# Patient Record
Sex: FEMALE | Race: BLACK OR AFRICAN AMERICAN | Employment: OTHER | URBAN - METROPOLITAN AREA
[De-identification: names, ages, dates, MRNs, and addresses within clinical notes are randomized per-mention and may not be internally consistent; named-entity substitution may affect disease eponyms.]

---

## 2021-08-19 ENCOUNTER — HOSPITAL ENCOUNTER (INPATIENT)
Age: 76
LOS: 8 days | Discharge: HOSPICE | DRG: 871 | End: 2021-08-27
Attending: EMERGENCY MEDICINE | Admitting: INTERNAL MEDICINE
Payer: MEDICARE

## 2021-08-19 ENCOUNTER — APPOINTMENT (OUTPATIENT)
Dept: GENERAL RADIOLOGY | Age: 76
DRG: 871 | End: 2021-08-19
Payer: MEDICARE

## 2021-08-19 DIAGNOSIS — Z51.5 HOSPICE CARE: ICD-10-CM

## 2021-08-19 DIAGNOSIS — J96.01 ACUTE RESPIRATORY FAILURE WITH HYPOXIA (HCC): Primary | ICD-10-CM

## 2021-08-19 DIAGNOSIS — A41.9 SEPTICEMIA (HCC): ICD-10-CM

## 2021-08-19 PROBLEM — E44.0 MODERATE MALNUTRITION (HCC): Chronic | Status: ACTIVE | Noted: 2021-08-19

## 2021-08-19 PROBLEM — J69.0 ACUTE ASPIRATION PNEUMONIA (HCC): Status: ACTIVE | Noted: 2021-08-19

## 2021-08-19 LAB
ANION GAP SERPL CALCULATED.3IONS-SCNC: 17 MMOL/L (ref 3–16)
ANION GAP SERPL CALCULATED.3IONS-SCNC: 18 MMOL/L (ref 3–16)
BASE EXCESS ARTERIAL: -1.6 MMOL/L (ref -3–3)
BASE EXCESS VENOUS: -2.2 MMOL/L
BASOPHILS ABSOLUTE: 0.1 K/UL (ref 0–0.2)
BASOPHILS ABSOLUTE: 0.1 K/UL (ref 0–0.2)
BASOPHILS RELATIVE PERCENT: 0.5 %
BASOPHILS RELATIVE PERCENT: 0.6 %
BUN BLDV-MCNC: 19 MG/DL (ref 7–20)
BUN BLDV-MCNC: 19 MG/DL (ref 7–20)
CALCIUM SERPL-MCNC: 10 MG/DL (ref 8.3–10.6)
CALCIUM SERPL-MCNC: 8.9 MG/DL (ref 8.3–10.6)
CARBOXYHEMOGLOBIN ARTERIAL: <1 % (ref 0–1.5)
CARBOXYHEMOGLOBIN: 1.4 %
CHLORIDE BLD-SCNC: 93 MMOL/L (ref 99–110)
CHLORIDE BLD-SCNC: 93 MMOL/L (ref 99–110)
CO2: 17 MMOL/L (ref 21–32)
CO2: 20 MMOL/L (ref 21–32)
CREAT SERPL-MCNC: 0.7 MG/DL (ref 0.6–1.2)
CREAT SERPL-MCNC: 0.8 MG/DL (ref 0.6–1.2)
D DIMER: 315 NG/ML DDU (ref 0–229)
EOSINOPHILS ABSOLUTE: 0 K/UL (ref 0–0.6)
EOSINOPHILS ABSOLUTE: 0 K/UL (ref 0–0.6)
EOSINOPHILS RELATIVE PERCENT: 0 %
EOSINOPHILS RELATIVE PERCENT: 0.2 %
ESTIMATED AVERAGE GLUCOSE: 125.5 MG/DL
GFR AFRICAN AMERICAN: >60
GFR AFRICAN AMERICAN: >60
GFR NON-AFRICAN AMERICAN: >60
GFR NON-AFRICAN AMERICAN: >60
GLUCOSE BLD-MCNC: 107 MG/DL (ref 70–99)
GLUCOSE BLD-MCNC: 109 MG/DL (ref 70–99)
GLUCOSE BLD-MCNC: 124 MG/DL (ref 70–99)
GLUCOSE BLD-MCNC: 128 MG/DL (ref 70–99)
GLUCOSE BLD-MCNC: 158 MG/DL (ref 70–99)
GLUCOSE BLD-MCNC: 176 MG/DL (ref 70–99)
GLUCOSE BLD-MCNC: 78 MG/DL (ref 70–99)
HBA1C MFR BLD: 6 %
HCO3 ARTERIAL: 21.5 MMOL/L (ref 21–29)
HCO3 VENOUS: 22 MMOL/L (ref 23–29)
HCT VFR BLD CALC: 30.6 % (ref 36–48)
HCT VFR BLD CALC: 35.3 % (ref 36–48)
HEMOGLOBIN, ART, EXTENDED: 10.2 G/DL (ref 12–16)
HEMOGLOBIN: 10.2 G/DL (ref 12–16)
HEMOGLOBIN: 11.7 G/DL (ref 12–16)
LACTIC ACID: 1.1 MMOL/L (ref 0.4–2)
LYMPHOCYTES ABSOLUTE: 0.7 K/UL (ref 1–5.1)
LYMPHOCYTES ABSOLUTE: 0.7 K/UL (ref 1–5.1)
LYMPHOCYTES RELATIVE PERCENT: 4.5 %
LYMPHOCYTES RELATIVE PERCENT: 5.4 %
MAGNESIUM: 1.4 MG/DL (ref 1.8–2.4)
MCH RBC QN AUTO: 28.9 PG (ref 26–34)
MCH RBC QN AUTO: 29 PG (ref 26–34)
MCHC RBC AUTO-ENTMCNC: 33.1 G/DL (ref 31–36)
MCHC RBC AUTO-ENTMCNC: 33.2 G/DL (ref 31–36)
MCV RBC AUTO: 87.3 FL (ref 80–100)
MCV RBC AUTO: 87.3 FL (ref 80–100)
METHEMOGLOBIN ARTERIAL: 0.6 %
METHEMOGLOBIN VENOUS: 0.1 %
MONOCYTES ABSOLUTE: 0.8 K/UL (ref 0–1.3)
MONOCYTES ABSOLUTE: 1 K/UL (ref 0–1.3)
MONOCYTES RELATIVE PERCENT: 6.2 %
MONOCYTES RELATIVE PERCENT: 6.5 %
NEUTROPHILS ABSOLUTE: 11.7 K/UL (ref 1.7–7.7)
NEUTROPHILS ABSOLUTE: 13.4 K/UL (ref 1.7–7.7)
NEUTROPHILS RELATIVE PERCENT: 87.6 %
NEUTROPHILS RELATIVE PERCENT: 88.5 %
O2 SAT, ARTERIAL: 99.8 %
O2 SAT, VEN: 86 %
O2 THERAPY: ABNORMAL
O2 THERAPY: ABNORMAL
PCO2 ARTERIAL: 30.1 MMHG (ref 35–45)
PCO2, VEN: 36.8 MMHG (ref 40–50)
PDW BLD-RTO: 16.9 % (ref 12.4–15.4)
PDW BLD-RTO: 16.9 % (ref 12.4–15.4)
PERFORMED ON: ABNORMAL
PERFORMED ON: NORMAL
PH ARTERIAL: 7.46 (ref 7.35–7.45)
PH VENOUS: 7.39 (ref 7.35–7.45)
PHOSPHORUS: 3.5 MG/DL (ref 2.5–4.9)
PLATELET # BLD: 366 K/UL (ref 135–450)
PLATELET # BLD: 419 K/UL (ref 135–450)
PMV BLD AUTO: 7.6 FL (ref 5–10.5)
PMV BLD AUTO: 7.8 FL (ref 5–10.5)
PO2 ARTERIAL: 126 MMHG (ref 75–108)
PO2, VEN: 53 MMHG
POTASSIUM REFLEX MAGNESIUM: 3.8 MMOL/L (ref 3.5–5.1)
POTASSIUM REFLEX MAGNESIUM: 3.8 MMOL/L (ref 3.5–5.1)
PRO-BNP: 513 PG/ML (ref 0–449)
PROCALCITONIN: 0.31 NG/ML (ref 0–0.15)
RBC # BLD: 3.51 M/UL (ref 4–5.2)
RBC # BLD: 4.04 M/UL (ref 4–5.2)
SARS-COV-2, PCR: NOT DETECTED
SODIUM BLD-SCNC: 128 MMOL/L (ref 136–145)
SODIUM BLD-SCNC: 130 MMOL/L (ref 136–145)
TCO2 ARTERIAL: 22.4 MMOL/L
TCO2 CALC VENOUS: 23 MMOL/L
TROPONIN: <0.01 NG/ML
WBC # BLD: 13.4 K/UL (ref 4–11)
WBC # BLD: 15.1 K/UL (ref 4–11)

## 2021-08-19 PROCEDURE — APPNB15 APP NON BILLABLE TIME 0-15 MINS: Performed by: NURSE PRACTITIONER

## 2021-08-19 PROCEDURE — 6370000000 HC RX 637 (ALT 250 FOR IP): Performed by: NURSE PRACTITIONER

## 2021-08-19 PROCEDURE — 2580000003 HC RX 258: Performed by: INTERNAL MEDICINE

## 2021-08-19 PROCEDURE — 6370000000 HC RX 637 (ALT 250 FOR IP): Performed by: EMERGENCY MEDICINE

## 2021-08-19 PROCEDURE — 87040 BLOOD CULTURE FOR BACTERIA: CPT

## 2021-08-19 PROCEDURE — 84145 PROCALCITONIN (PCT): CPT

## 2021-08-19 PROCEDURE — 0BH17EZ INSERTION OF ENDOTRACHEAL AIRWAY INTO TRACHEA, VIA NATURAL OR ARTIFICIAL OPENING: ICD-10-PCS | Performed by: EMERGENCY MEDICINE

## 2021-08-19 PROCEDURE — 96365 THER/PROPH/DIAG IV INF INIT: CPT

## 2021-08-19 PROCEDURE — 2580000003 HC RX 258: Performed by: EMERGENCY MEDICINE

## 2021-08-19 PROCEDURE — 85379 FIBRIN DEGRADATION QUANT: CPT

## 2021-08-19 PROCEDURE — 36600 WITHDRAWAL OF ARTERIAL BLOOD: CPT

## 2021-08-19 PROCEDURE — 2500000003 HC RX 250 WO HCPCS: Performed by: INTERNAL MEDICINE

## 2021-08-19 PROCEDURE — 5A1935Z RESPIRATORY VENTILATION, LESS THAN 24 CONSECUTIVE HOURS: ICD-10-PCS | Performed by: EMERGENCY MEDICINE

## 2021-08-19 PROCEDURE — 2000000000 HC ICU R&B

## 2021-08-19 PROCEDURE — 84484 ASSAY OF TROPONIN QUANT: CPT

## 2021-08-19 PROCEDURE — 84100 ASSAY OF PHOSPHORUS: CPT

## 2021-08-19 PROCEDURE — 2700000000 HC OXYGEN THERAPY PER DAY

## 2021-08-19 PROCEDURE — 71045 X-RAY EXAM CHEST 1 VIEW: CPT

## 2021-08-19 PROCEDURE — 96375 TX/PRO/DX INJ NEW DRUG ADDON: CPT

## 2021-08-19 PROCEDURE — 83605 ASSAY OF LACTIC ACID: CPT

## 2021-08-19 PROCEDURE — 96374 THER/PROPH/DIAG INJ IV PUSH: CPT

## 2021-08-19 PROCEDURE — 6360000002 HC RX W HCPCS: Performed by: INTERNAL MEDICINE

## 2021-08-19 PROCEDURE — U0003 INFECTIOUS AGENT DETECTION BY NUCLEIC ACID (DNA OR RNA); SEVERE ACUTE RESPIRATORY SYNDROME CORONAVIRUS 2 (SARS-COV-2) (CORONAVIRUS DISEASE [COVID-19]), AMPLIFIED PROBE TECHNIQUE, MAKING USE OF HIGH THROUGHPUT TECHNOLOGIES AS DESCRIBED BY CMS-2020-01-R: HCPCS

## 2021-08-19 PROCEDURE — 2500000003 HC RX 250 WO HCPCS: Performed by: EMERGENCY MEDICINE

## 2021-08-19 PROCEDURE — U0005 INFEC AGEN DETEC AMPLI PROBE: HCPCS

## 2021-08-19 PROCEDURE — 99291 CRITICAL CARE FIRST HOUR: CPT | Performed by: INTERNAL MEDICINE

## 2021-08-19 PROCEDURE — 31500 INSERT EMERGENCY AIRWAY: CPT

## 2021-08-19 PROCEDURE — 94002 VENT MGMT INPAT INIT DAY: CPT

## 2021-08-19 PROCEDURE — 6360000002 HC RX W HCPCS: Performed by: EMERGENCY MEDICINE

## 2021-08-19 PROCEDURE — 80048 BASIC METABOLIC PNL TOTAL CA: CPT

## 2021-08-19 PROCEDURE — 6360000002 HC RX W HCPCS: Performed by: NURSE PRACTITIONER

## 2021-08-19 PROCEDURE — 99285 EMERGENCY DEPT VISIT HI MDM: CPT

## 2021-08-19 PROCEDURE — 83735 ASSAY OF MAGNESIUM: CPT

## 2021-08-19 PROCEDURE — 36556 INSERT NON-TUNNEL CV CATH: CPT

## 2021-08-19 PROCEDURE — 85025 COMPLETE CBC W/AUTO DIFF WBC: CPT

## 2021-08-19 PROCEDURE — 2500000003 HC RX 250 WO HCPCS: Performed by: NURSE PRACTITIONER

## 2021-08-19 PROCEDURE — 94761 N-INVAS EAR/PLS OXIMETRY MLT: CPT

## 2021-08-19 PROCEDURE — 02HV33Z INSERTION OF INFUSION DEVICE INTO SUPERIOR VENA CAVA, PERCUTANEOUS APPROACH: ICD-10-PCS | Performed by: EMERGENCY MEDICINE

## 2021-08-19 PROCEDURE — 83036 HEMOGLOBIN GLYCOSYLATED A1C: CPT

## 2021-08-19 PROCEDURE — 36415 COLL VENOUS BLD VENIPUNCTURE: CPT

## 2021-08-19 PROCEDURE — 83880 ASSAY OF NATRIURETIC PEPTIDE: CPT

## 2021-08-19 PROCEDURE — 94640 AIRWAY INHALATION TREATMENT: CPT

## 2021-08-19 PROCEDURE — 87641 MR-STAPH DNA AMP PROBE: CPT

## 2021-08-19 PROCEDURE — 82803 BLOOD GASES ANY COMBINATION: CPT

## 2021-08-19 RX ORDER — FLUOXETINE HYDROCHLORIDE 20 MG/5ML
LIQUID ORAL 2 TIMES DAILY
COMMUNITY

## 2021-08-19 RX ORDER — VALSARTAN 160 MG/1
160 TABLET ORAL DAILY
Status: ON HOLD | COMMUNITY
End: 2021-08-27 | Stop reason: HOSPADM

## 2021-08-19 RX ORDER — NICOTINE POLACRILEX 4 MG
15 LOZENGE BUCCAL PRN
Status: DISCONTINUED | OUTPATIENT
Start: 2021-08-19 | End: 2021-08-27 | Stop reason: HOSPADM

## 2021-08-19 RX ORDER — SUCCINYLCHOLINE CHLORIDE 20 MG/ML
60 INJECTION INTRAMUSCULAR; INTRAVENOUS ONCE
Status: DISCONTINUED | OUTPATIENT
Start: 2021-08-19 | End: 2021-08-19

## 2021-08-19 RX ORDER — ACETAMINOPHEN 650 MG/1
650 SUPPOSITORY RECTAL EVERY 4 HOURS PRN
Status: DISCONTINUED | OUTPATIENT
Start: 2021-08-19 | End: 2021-08-27 | Stop reason: HOSPADM

## 2021-08-19 RX ORDER — ACETAMINOPHEN 160 MG
TABLET,DISINTEGRATING ORAL
Status: ON HOLD | COMMUNITY
End: 2021-08-27 | Stop reason: HOSPADM

## 2021-08-19 RX ORDER — ACETAMINOPHEN 325 MG/1
650 TABLET ORAL EVERY 4 HOURS PRN
Status: DISCONTINUED | OUTPATIENT
Start: 2021-08-19 | End: 2021-08-27 | Stop reason: HOSPADM

## 2021-08-19 RX ORDER — SODIUM CHLORIDE 9 MG/ML
INJECTION, SOLUTION INTRAVENOUS CONTINUOUS
Status: DISCONTINUED | OUTPATIENT
Start: 2021-08-19 | End: 2021-08-19

## 2021-08-19 RX ORDER — MIRTAZAPINE 15 MG/1
7.5 TABLET, ORALLY DISINTEGRATING ORAL NIGHTLY
Status: DISCONTINUED | OUTPATIENT
Start: 2021-08-19 | End: 2021-08-27 | Stop reason: HOSPADM

## 2021-08-19 RX ORDER — LORAZEPAM 2 MG/ML
2 INJECTION INTRAMUSCULAR DAILY PRN
Status: ON HOLD | COMMUNITY
End: 2021-08-19

## 2021-08-19 RX ORDER — SUCRALFATE 1 G/1
1 TABLET ORAL
Status: ON HOLD | COMMUNITY
End: 2021-08-27 | Stop reason: HOSPADM

## 2021-08-19 RX ORDER — MIRTAZAPINE 7.5 MG/1
7.5 TABLET, FILM COATED ORAL NIGHTLY
COMMUNITY

## 2021-08-19 RX ORDER — DEXTROSE MONOHYDRATE 50 MG/ML
100 INJECTION, SOLUTION INTRAVENOUS PRN
Status: DISCONTINUED | OUTPATIENT
Start: 2021-08-19 | End: 2021-08-27 | Stop reason: HOSPADM

## 2021-08-19 RX ORDER — CALCIUM CARBONATE 200(500)MG
2 TABLET,CHEWABLE ORAL EVERY 4 HOURS PRN
Status: ON HOLD | COMMUNITY
End: 2021-08-27 | Stop reason: HOSPADM

## 2021-08-19 RX ORDER — POLYETHYLENE GLYCOL 3350 17 G/17G
17 POWDER, FOR SOLUTION ORAL 2 TIMES DAILY
COMMUNITY

## 2021-08-19 RX ORDER — MAGNESIUM OXIDE 400 MG/1
400 TABLET ORAL 2 TIMES DAILY
Status: ON HOLD | COMMUNITY
End: 2021-08-27 | Stop reason: HOSPADM

## 2021-08-19 RX ORDER — IPRATROPIUM BROMIDE AND ALBUTEROL SULFATE 2.5; .5 MG/3ML; MG/3ML
1 SOLUTION RESPIRATORY (INHALATION) ONCE
Status: COMPLETED | OUTPATIENT
Start: 2021-08-19 | End: 2021-08-19

## 2021-08-19 RX ORDER — SODIUM CHLORIDE 9 MG/ML
25 INJECTION, SOLUTION INTRAVENOUS PRN
Status: DISCONTINUED | OUTPATIENT
Start: 2021-08-19 | End: 2021-08-27 | Stop reason: HOSPADM

## 2021-08-19 RX ORDER — 0.9 % SODIUM CHLORIDE 0.9 %
1000 INTRAVENOUS SOLUTION INTRAVENOUS ONCE
Status: COMPLETED | OUTPATIENT
Start: 2021-08-19 | End: 2021-08-19

## 2021-08-19 RX ORDER — ARIPIPRAZOLE 1 MG/ML
5 SOLUTION ORAL 2 TIMES DAILY
COMMUNITY

## 2021-08-19 RX ORDER — ETOMIDATE 2 MG/ML
20 INJECTION INTRAVENOUS ONCE
Status: COMPLETED | OUTPATIENT
Start: 2021-08-19 | End: 2021-08-19

## 2021-08-19 RX ORDER — CHLORHEXIDINE GLUCONATE 0.12 MG/ML
15 RINSE ORAL 2 TIMES DAILY
Status: DISCONTINUED | OUTPATIENT
Start: 2021-08-19 | End: 2021-08-19

## 2021-08-19 RX ORDER — LACTOBACILLUS RHAMNOSUS GG 10B CELL
2 CAPSULE ORAL 2 TIMES DAILY WITH MEALS
Status: DISCONTINUED | OUTPATIENT
Start: 2021-08-19 | End: 2021-08-27 | Stop reason: HOSPADM

## 2021-08-19 RX ORDER — DEXTROSE MONOHYDRATE 25 G/50ML
12.5 INJECTION, SOLUTION INTRAVENOUS PRN
Status: DISCONTINUED | OUTPATIENT
Start: 2021-08-19 | End: 2021-08-27 | Stop reason: HOSPADM

## 2021-08-19 RX ORDER — ROCURONIUM BROMIDE 10 MG/ML
60 INJECTION, SOLUTION INTRAVENOUS ONCE
Status: DISCONTINUED | OUTPATIENT
Start: 2021-08-19 | End: 2021-08-19

## 2021-08-19 RX ORDER — PROPOFOL 10 MG/ML
5-50 INJECTION, EMULSION INTRAVENOUS
Status: DISCONTINUED | OUTPATIENT
Start: 2021-08-19 | End: 2021-08-19

## 2021-08-19 RX ORDER — HEPARIN SODIUM 5000 [USP'U]/ML
5000 INJECTION, SOLUTION INTRAVENOUS; SUBCUTANEOUS 2 TIMES DAILY
Status: DISCONTINUED | OUTPATIENT
Start: 2021-08-19 | End: 2021-08-27 | Stop reason: HOSPADM

## 2021-08-19 RX ORDER — SODIUM CHLORIDE 0.9 % (FLUSH) 0.9 %
10 SYRINGE (ML) INJECTION EVERY 12 HOURS SCHEDULED
Status: DISCONTINUED | OUTPATIENT
Start: 2021-08-19 | End: 2021-08-27 | Stop reason: HOSPADM

## 2021-08-19 RX ORDER — ONDANSETRON 2 MG/ML
4 INJECTION INTRAMUSCULAR; INTRAVENOUS EVERY 4 HOURS PRN
Status: DISCONTINUED | OUTPATIENT
Start: 2021-08-19 | End: 2021-08-27 | Stop reason: HOSPADM

## 2021-08-19 RX ORDER — SODIUM CHLORIDE 0.9 % (FLUSH) 0.9 %
10 SYRINGE (ML) INJECTION PRN
Status: DISCONTINUED | OUTPATIENT
Start: 2021-08-19 | End: 2021-08-27 | Stop reason: HOSPADM

## 2021-08-19 RX ORDER — MIDAZOLAM HYDROCHLORIDE 1 MG/ML
2 INJECTION INTRAMUSCULAR; INTRAVENOUS ONCE
Status: DISCONTINUED | OUTPATIENT
Start: 2021-08-19 | End: 2021-08-19

## 2021-08-19 RX ORDER — MAGNESIUM SULFATE IN WATER 40 MG/ML
4000 INJECTION, SOLUTION INTRAVENOUS ONCE
Status: COMPLETED | OUTPATIENT
Start: 2021-08-19 | End: 2021-08-19

## 2021-08-19 RX ORDER — VALSARTAN 160 MG/1
160 TABLET ORAL DAILY
Status: DISCONTINUED | OUTPATIENT
Start: 2021-08-19 | End: 2021-08-27 | Stop reason: HOSPADM

## 2021-08-19 RX ORDER — LORAZEPAM 2 MG/ML
0.26 CONCENTRATE ORAL
Status: ON HOLD | COMMUNITY
End: 2021-08-27 | Stop reason: SDUPTHER

## 2021-08-19 RX ORDER — ARIPIPRAZOLE 5 MG/1
5 TABLET ORAL 2 TIMES DAILY
Status: DISCONTINUED | OUTPATIENT
Start: 2021-08-19 | End: 2021-08-27 | Stop reason: HOSPADM

## 2021-08-19 RX ORDER — POLYETHYLENE GLYCOL 3350 17 G/17G
17 POWDER, FOR SOLUTION ORAL DAILY PRN
Status: DISCONTINUED | OUTPATIENT
Start: 2021-08-19 | End: 2021-08-27 | Stop reason: HOSPADM

## 2021-08-19 RX ORDER — FLUOXETINE HYDROCHLORIDE 20 MG/5ML
20 LIQUID ORAL 2 TIMES DAILY
Status: DISCONTINUED | OUTPATIENT
Start: 2021-08-20 | End: 2021-08-27 | Stop reason: HOSPADM

## 2021-08-19 RX ADMIN — SODIUM BICARBONATE: 84 INJECTION, SOLUTION INTRAVENOUS at 21:29

## 2021-08-19 RX ADMIN — FAMOTIDINE 20 MG: 10 INJECTION, SOLUTION INTRAVENOUS at 09:09

## 2021-08-19 RX ADMIN — HEPARIN SODIUM 5000 UNITS: 5000 INJECTION INTRAVENOUS; SUBCUTANEOUS at 09:57

## 2021-08-19 RX ADMIN — IPRATROPIUM BROMIDE AND ALBUTEROL SULFATE 1 AMPULE: .5; 3 SOLUTION RESPIRATORY (INHALATION) at 02:46

## 2021-08-19 RX ADMIN — HEPARIN SODIUM 5000 UNITS: 5000 INJECTION INTRAVENOUS; SUBCUTANEOUS at 20:32

## 2021-08-19 RX ADMIN — VANCOMYCIN HYDROCHLORIDE 1000 MG: 1 INJECTION, POWDER, LYOPHILIZED, FOR SOLUTION INTRAVENOUS at 04:01

## 2021-08-19 RX ADMIN — MIRTAZAPINE 7.5 MG: 15 TABLET, ORALLY DISINTEGRATING ORAL at 20:29

## 2021-08-19 RX ADMIN — SODIUM CHLORIDE, PRESERVATIVE FREE 10 ML: 5 INJECTION INTRAVENOUS at 09:09

## 2021-08-19 RX ADMIN — CEFEPIME HYDROCHLORIDE 2000 MG: 2 INJECTION, POWDER, FOR SOLUTION INTRAVENOUS at 17:15

## 2021-08-19 RX ADMIN — MUPIROCIN: 20 OINTMENT TOPICAL at 20:24

## 2021-08-19 RX ADMIN — SODIUM CHLORIDE, PRESERVATIVE FREE 10 ML: 5 INJECTION INTRAVENOUS at 20:13

## 2021-08-19 RX ADMIN — SODIUM BICARBONATE: 84 INJECTION, SOLUTION INTRAVENOUS at 09:22

## 2021-08-19 RX ADMIN — CEFEPIME HYDROCHLORIDE 2000 MG: 2 INJECTION, POWDER, FOR SOLUTION INTRAVENOUS at 02:50

## 2021-08-19 RX ADMIN — SODIUM CHLORIDE 3000 MG: 900 INJECTION INTRAVENOUS at 03:25

## 2021-08-19 RX ADMIN — SODIUM CHLORIDE 1000 ML: 9 INJECTION, SOLUTION INTRAVENOUS at 02:28

## 2021-08-19 RX ADMIN — MAGNESIUM SULFATE HEPTAHYDRATE 4000 MG: 40 INJECTION, SOLUTION INTRAVENOUS at 11:04

## 2021-08-19 RX ADMIN — INSULIN LISPRO 2 UNITS: 100 INJECTION, SOLUTION INTRAVENOUS; SUBCUTANEOUS at 09:56

## 2021-08-19 RX ADMIN — ETOMIDATE 20 MG: 2 INJECTION INTRAVENOUS at 02:30

## 2021-08-19 RX ADMIN — PROPOFOL 5 MCG/KG/MIN: 10 INJECTION, EMULSION INTRAVENOUS at 02:29

## 2021-08-19 RX ADMIN — MUPIROCIN: 20 OINTMENT TOPICAL at 12:00

## 2021-08-19 ASSESSMENT — PULMONARY FUNCTION TESTS
PIF_VALUE: 11
PIF_VALUE: 11
PIF_VALUE: 14
PIF_VALUE: 15
PIF_VALUE: 15
PIF_VALUE: 21
PIF_VALUE: 15

## 2021-08-19 ASSESSMENT — PAIN SCALES - GENERAL
PAINLEVEL_OUTOF10: 0

## 2021-08-19 NOTE — CONSULTS
REASON FOR CONSULTATION/CC: aspiration       Consult at request of Swapna Shannon MD for aspiration     PCP: [place] MD  Established Pulmonologist:  None    HISTORY OF PRESENT ILLNESS: Cinthya Bradshaw is a 76y.o. year old female with a history of esophageal stricture  who presents with :     Patient was transferred from a nursing home secondary to respiratory distress with hypoxemia. ER note currently pending, currently states is a limited DNR only limited to chest compressions. Okay with intubation. Limited history from this note secondary to patient's mental status and respiratory distress. Therefore, the patient was intubated. Outside charts requested  Minimal information prior to admission. To cardiology notes from 2018 available. Patient was being treated for hypertension, palpitations. Albuterol medication profile with a chart history of COPD. 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 Changed to Unasyn cefepime   From nursing home  *Procalcitonin elevated. * SBT      Metabolic acidosis with lactic acidosis  *  Bicarb drip for hyponatremia and metabolic acidosis      Depression  *Restart home psychiatric medications  *Wean propofol        Anemia  *Close to baseline per July 2021 admission.     Electrolytes  *Hyponatremia  - Ca:  - Mg:  - Phos:    Prophylaxis  - GI - pepcid    - DVT -  lovenox  -> heparin   - VAP - peridex  - C. Diff - culturelle   - Nasal Decolonization - Bactroban    Nutrition  - Diet NPO         Access  Arterial      PICC          CVC       CVC Triple Lumen 08/19/21 Femoral (Active)   Continued need for line? Yes 08/19/21 0556   Site Assessment Dry; Intact 08/19/21 0556   Proximal Lumen Status Infusing;Capped 08/19/21 0556   Medial Lumen Status Normal saline locked; Capped 08/19/21 0556   Distal Lumen Status Normal saline locked; Capped 08/19/21 0556   Dressing Status Dry; Intact; Old drainage 08/19/21 0556   Dressing Change Due 08/25/21 08/19/21 0556   Number of days: 0                This note was transcribed using [id number] disco volante. Please disregard any translational errors. Thank you for the consult    Alayna Smith [facility]  [phone]             Data:     PAST MEDICAL HISTORY:  Past Medical History:   Diagnosis Date    Anemia     Catatonic state     Dementia ([facility] [address]     Depression     Hypertension        PAST SURGICAL HISTORY:  History reviewed. No pertinent surgical history. FAMILY HISTORY:  family history is not on file. SOCIAL HISTORY:   has an unknown smoking status. She has never used smokeless tobacco.    Scheduled Meds:   sodium chloride flush  10 mL Intravenous 2 times per day    enoxaparin  30 mg Subcutaneous Daily    ampicillin-sulbactam  3,000 mg Intravenous Q12H       Continuous Infusions:   propofol 50 mcg/kg/min (08/19/21 0419)    midazolam Stopped (08/19/21 0356)    sodium chloride      sodium chloride         PRN Meds:  sodium chloride flush, sodium chloride, ondansetron, polyethylene glycol, acetaminophen **OR** acetaminophen    ALLERGIES:  Patient is allergic to buspar [buspirone], ciprofloxacin, food, and januvia [sitagliptin].     REVIEW OF SYSTEMS:   alma     Objective:   PHYSICAL EXAM:  Blood pressure (!) 159/82, pulse 121, temperature 100.1 °F (37.8 °C), temperature source Axillary, resp. rate 18, height 4' 10\" (1.473 m), weight 82 lb (37.2 kg), SpO2 99 %.'  Gen: No distress. sedated  Eyes: PERRL. No sclera icterus. No conjunctival injection. ENT: No discharge. Pharynx clear. External appearance of ears and nose normal.  Neck: Trachea midline. No obvious mass. Resp: No accessory muscle use. No crackles. No wheezes. No rhonchi. CV: Regular rate. Regular rhythm. No murmur or rub. No edema. GI: Non-tender. Non-distended. No hernia. Skin: Warm, dry, normal texture and turgor. No nodule on exposed extremities. Lymph: No cervical LAD. No supraclavicular LAD. M/S: No cyanosis. No clubbing. No joint deformity. Neuro: sedated   Psych: sedated    Data Reviewed:   LABS:  CBC:   Recent Labs     08/19/21  0208   WBC 13.4*   HGB 11.7*   HCT 35.3*   MCV 87.3        BMP:   Recent Labs     08/19/21  0208   *   K 3.8   CL 93*   CO2 20*   BUN 19   CREATININE 0.7     LIVER PROFILE: No results for input(s): AST, ALT, LIPASE, BILIDIR, BILITOT, ALKPHOS in the last 72 hours. Invalid input(s): AMYLASE,  ALB  PT/INR: No results for input(s): PROTIME, INR in the last 72 hours. APTT: No results for input(s): APTT in the last 72 hours. UA:No results for input(s): NITRITE, COLORU, PHUR, LABCAST, WBCUA, RBCUA, MUCUS, TRICHOMONAS, YEAST, BACTERIA, CLARITYU, SPECGRAV, LEUKOCYTESUR, UROBILINOGEN, BILIRUBINUR, BLOODU, GLUCOSEU, AMORPHOUS in the last 72 hours. Invalid input(s): KETONESU  No results for input(s): PHART, QBA1ZWD, PO2ART in the last 72 hours.     Vent Information  $Ventilation: $Initial Day  Suction Catheter Diameter: 14  Equipment ID: [id number]  Vent Type: [phone]  Vent Mode: AC/VC+  Vt Ordered: 350 mL  Rate Set: 18 bmp  Peak Flow: 0 L/min  Pressure Support: 0 cmH20  FiO2 : 60 %  SpO2: 99 %  SpO2/FiO2 ratio: 166.67  Sensitivity: 3  PEEP/CPAP: 5  I Time/ I Time %: 0.9 s  Humidification Source: Heated wire  Humidification Temp: 37  Humidification Temp Measured: 34    Radiology Review:  Pertinent images / reports were reviewed as a part of this visit. CT Chest w/ contrast: No results found for this or any previous visit. CT Chest w/o contrast: No results found for this or any previous visit. CTPA: No results found for this or any previous visit. CXR PA/LAT: No results found for this or any previous visit. CXR portable: Results for orders placed during the hospital encounter of 08/19/21    XR CHEST PORTABLE    Narrative  EXAMINATION:  ONE XRAY VIEW OF THE CHEST    8/19/2021 2:59 am    COMPARISON:  08/19/2020 at 1:50 a.m. HISTORY:  ORDERING SYSTEM PROVIDED HISTORY: tube placement  TECHNOLOGIST PROVIDED HISTORY:  Reason for exam:->tube placement  Reason for Exam: tube placement  Acuity: Acute  Type of Exam: Initial    FINDINGS:  An endotracheal tube has been place though the tip is partially obscured by  an overlying enteric tube. The tip is approximately 2-3 cm above the tariq. Enteric tube traverses the esophagus and is partially looped just above the  level of the diaphragm. Right basilar airspace opacity is stable. The heart  size is normal.  There is no discernible pneumothorax. Impression  1. The endotracheal tube tip is 2-3 cm above the tariq. 2. The tip of the enteric tube is probably in a hiatal hernia as it remains  above the diaphragm and is partially coiled.

## 2021-08-19 NOTE — PLAN OF CARE
Problem: Nutrition  Goal: Optimal nutrition therapy  Outcome: Ongoing   Nutrition Problem #1: Moderate malnutrition  Intervention: Food and/or Nutrient Delivery:  (Monitor for start of nutrition)  Nutritional Goals: Initiate most appropriate form of nutrition

## 2021-08-19 NOTE — ED NOTES
Patient moved to room [address], received report from [address] [place] [address], RN  08/19/21 2447

## 2021-08-19 NOTE — H&P
Hospital Medicine History & Physical      PCP: Jane Sanders MD    Date of Admission: 8/19/2021    Date of Service: Pt seen/examined on 8/19/2021 and Admitted to Inpatient. Chief Complaint:  Respiratory failure      History Of Present Illness: The patient is a 76 y.o. female with hx depression, dementia, HTN, and anemia who presents to [facility] with respiratory failure. Patient is currently intubated and sedated and unable to provide any history. According to the notes, patient was having shortness of breath with congestion for the past two days at [facility]. She was saturating 76% on 5L at the nursing home and only responded up to 84% on a nonrebreather. She arrived in the ED in respiratory distress and the ED physician was concerned for aspiration. The daughter was there and stated she was a Limited Code but ok with intubation and so she was intubated in the ED for respiratory failure. In the ED, labs were significant for a sodium of 130, chloride of 93, biacrb of 19, WBC count of 13.4K. VBG showed a pH of 7.391 and pCO2 of 36.8. CXR showed bronchial thickening and interstitial airspace disease in the left infrahilar region and right lung base laterally. COVID is pending at the time of admission. Past Medical History:        Diagnosis Date    Anemia     Catatonic state     Dementia ([facility] [address]     Depression     Hypertension        Past Surgical History:    History reviewed. No pertinent surgical history. Medications Prior to Admission:    Prior to Admission medications    Medication Sig Start Date End Date Taking?  Authorizing Provider   ARIPiprazole (ABILIFY) 1 MG/ML SOLN solution Take 5 mg by mouth daily   Yes Historical Provider, MD   calcium carbonate (TUMS) 500 MG chewable tablet Take 2 tablets by mouth daily   Yes Historical Provider, MD Cholecalciferol (VITAMIN D3) 50 MCG (2000 UT) CAPS Take by mouth   Yes Historical Provider, MD   Cholecalciferol (D3-50 PO) Take 5,000 Units by mouth daily   Yes Historical Provider, MD   FLUoxetine (PROZAC) 20 MG/5ML solution Take by mouth daily   Yes Historical Provider, MD   Iron Polysacch Hiwyr-B19--0.025-1 MG TABS Take by mouth   Yes Historical Provider, MD   LORazepam (ATIVAN) 2 MG/ML injection Infuse 2 mg intravenously daily as needed. Give 0.13 ml by mouth   Yes Historical Provider, MD   magnesium oxide (MAG-OX) 400 MG tablet Take 400 mg by mouth daily   Yes Historical Provider, MD   mirtazapine (REMERON) 7.5 MG tablet Take 7.5 mg by mouth nightly   Yes Historical Provider, MD   polyethylene glycol (GLYCOLAX) 17 g packet Take 17 g by mouth daily as needed for Constipation   Yes Historical Provider, MD   sucralfate (CARAFATE) 1 GM tablet Take 1 g by mouth 3 times daily (before meals)   Yes Historical Provider, MD   valsartan (DIOVAN) 160 MG tablet Take 160 mg by mouth daily   Yes Historical Provider, MD       Allergies:  Buspar [buspirone], Ciprofloxacin, Food, and Januvia [sitagliptin]    Social History:  The patient currently lives at a nursing facility. TOBACCO:   has an unknown smoking status. She has never used smokeless tobacco.  ETOH:   reports previous alcohol use. Family History:  Reviewed in detail and negative for DM, Early CAD, Cancer, CVA. Positive as follows:    History reviewed. No pertinent family history. REVIEW OF SYSTEMS:   Unable to obtain due to altered mental status. PHYSICAL EXAM:    /71   Pulse 112   Temp 100.1 °F (37.8 °C) (Axillary)   Resp 18   Ht 4' 10\" (1.473 m)   Wt 82 lb (37.2 kg)   SpO2 100%   BMI 17.14 kg/m²     General appearance: Intubated and sedated. Will wake to voice but does not follow commands. HEENT Normal cephalic, atraumatic without obvious deformity. Pupils equal, round, and reactive to light. Extra ocular muscles intact. Conjunctivae/corneas clear. Neck: Supple, No jugular venous distention/bruits. Trachea midline without thyromegaly or adenopathy with full range of motion. Lungs: Intubated. Mechanical breath sounds. Heart: Tachycardic rate and regular rhythm with Normal S1/S2 without murmurs, rubs or gallops, point of maximum impulse non-displaced  Abdomen: Soft, non-tender or non-distended without rigidity or guarding and positive bowel sounds all four quadrants. Extremities: No clubbing, cyanosis, or edema bilaterally. Full range of motion without deformity and normal gait intact. Skin: Skin color, texture, turgor normal.  No rashes or lesions. Neurologic: Sedated. Mental status: Sedated. Capillary Refill: Acceptable  < 3 seconds  Peripheral Pulses: +3 Easily felt, not easily obliterated with pressure      CXR:  I have reviewed the CXR with the following interpretation: bronchial thickening and interstitial airspace disease in the left intrahilar region and right lung base laterally      XR CHEST PORTABLE   Final Result   1. The endotracheal tube tip is 2-3 cm above the tariq. 2. The tip of the enteric tube is probably in a hiatal hernia as it remains   above the diaphragm and is partially coiled. XR CHEST PORTABLE   Final Result   COPD, with bronchial thickening and interstitial airspace disease in the left   infrahilar region and right lung base laterally suspicious for superimposed   pneumonitis. CBC   Recent Labs     08/19/21  0208 08/19/21  0620   WBC 13.4* 15.1*   HGB 11.7* 10.2*   HCT 35.3* 30.6*    366      RENAL  Recent Labs     08/19/21  0208   *   K 3.8   CL 93*   CO2 20*   BUN 19   CREATININE 0.7     LFT'S  No results for input(s): AST, ALT, ALB, BILIDIR, BILITOT, ALKPHOS in the last 72 hours. COAG  No results for input(s): INR in the last 72 hours.   CARDIAC ENZYMES  Recent Labs     08/19/21  0208   TROPONINI <0.01       U/A:  No results found for: NITRITE, COLORU, WBCUA, RBCUA, MUCUS, BACTERIA, CLARITYU, SPECGRAV, LEUKOCYTESUR, BLOODU, GLUCOSEU, AMORPHOUS    ABG  No results found for: BRU2BDN, BEART, P1GCAKZL, PHART, THGBART, GNN3ZQG, PO2ART, BBY3OVS        Active Hospital Problems    Diagnosis Date Noted    Acute aspiration pneumonia ([facility] [address] [J69.0] 08/19/2021         PHYSICIANS CERTIFICATION:    I certify that Rianna Waldron is expected to be hospitalized for more than 2 midnights based on the following assessment and plan:      ASSESSMENT/PLAN:      Shortness of breath likely 2/2 aspiration pneumonia  -given dose of Vanc and cefepime in the ED, continue with cefepime  -check D-dimer; if elevated, obtain CTA Pulm with contrast    Sepsis 2/2 aspiration pneumonia  Meets at least 2 SIRS Criteria:  Tachypnea > 20  HR > 90  WBC > 12K  Source: lungs  -lactic acid q6h  -blood cultures x 2 drawn prior to administration of antibiotics  -IV antibiotics: cefepime  -IVF: 30 cc/kg given    Acute respiratory failure with hypoxia s/p mechanical intubation  -management as above  -wean oxygen to maintain SpO2 > 89%  -ventilator management per critical care    Anion gap metabolic acidosis  -bicarb gtt  -continue to trend and monitor    Depression  -continue home meds    Essential hypertension  -continue home meds    Dementia  -continue home meds    Hypomagnesemia  -replace PRN      DVT Prophylaxis: heparin  Diet: Diet NPO  Code Status: Limited  PT/OT Eval Status: defer    Dispo - admit ICU tele inpatient    I spent 40 minutes of critical care time due to life-threatening respiratory failure requiring intubation and admission to the ICU. Stefanie Jaquez MD    Thank you Aj Sebastian MD for the opportunity to be involved in this patient's care. If you have any questions or concerns please feel free to contact me at [phone].

## 2021-08-19 NOTE — PROGRESS NOTES
Clinical Pharmacy Note  Renal Dose Adjustment    Roberta Ford is receiving the following renally eliminated medications: Unasyn, Lovenox. Based on the patient's estimated creatinine clearance and urine output, the doses have been adjusted. Pharmacy will continue to monitor and adjust dose as needed for changes in renal function.       Janice Woodard [facility] 8/19/2021 6:01 AM

## 2021-08-19 NOTE — PROGRESS NOTES
4 Eyes Skin Assessment     NAME:  Hugh Caballero  YOB: 1945  MEDICAL RECORD NUMBER:  [id number]    The patient is being assess for  Admission    I agree that 2 RN's have performed a thorough Head to Toe Skin Assessment on the patient. ALL assessment sites listed below have been assessed. Areas assessed by both nurses:    Head, Face, Ears, Shoulders, Back, Chest, Arms, Elbows, Hands, Sacrum. Buttock, Coccyx, Ischium and Legs. Feet and Heels        Does the Patient have a Wound? Yes wound(s) were present on assessment.  LDA wound assessment was Initiated and completed        Yamil Prevention initiated:  Yes   Wound Care Orders initiated:  Yes    Pressure Injury (Stage 3,4, Unstageable, DTI, NWPT, and Complex wounds) if present place consult order under [de-identified] NA    New and Established Ostomies if present place consult order under : NA      Nurse 1 eSignature: Electronically signed by Dale Suazo RN on 8/19/21 at 7:45 AM EDT    **SHARE this note so that the co-signing nurse is able to place an eSignature**    Nurse 2 eSignature: Electronically signed by Comer Goltz, RN on 8/19/21 at 1:42 PM EDT

## 2021-08-19 NOTE — ED PROVIDER NOTES
EMERGENCY DEPARTMENT ENCOUNTER      Pt Name: Carlee Lima  MRN: [id number]  [place] [birth year]  Date of evaluation: 8/19/2021  Provider: Alfonso Gonzalez MD    CHIEF COMPLAINT       Chief Complaint   Patient presents with    Respiratory Distress     sob with congestion x 2 day at [facility]. 76% 5 liters at ECF. Up to 84% with NRB MASK        HISTORY OF PRESENT ILLNESS    Carlee Lima is a 76 y.o. female who presents to the emergency department with hypoxic respiratory distress. Patient presents in acute hypoxic respiratory distress from nursing home. There is concern for possible aspiration. Daughter did arrive and states that patient is a limited DNR. Is okay with intubation and central line but does not want chest compressions, shocks, any other invasive measures. History otherwise limited secondary to patient's mental status and degree of respiratory distress. Nursing Notes were reviewed. Including nursing noted for FM, Surgical History, Past Medical History, Social History, vitals, and allergies; agree with all. REVIEW OF SYSTEMS       Review of Systems   Unable to perform ROS: Severe respiratory distress     PAST MEDICAL HISTORY     Past Medical History:   Diagnosis Date    Anemia     Catatonic state     Dementia ([facility] [address]     Depression     Hypertension        SURGICAL HISTORY     History reviewed. No pertinent surgical history. CURRENT MEDICATIONS       Current Discharge Medication List      CONTINUE these medications which have NOT CHANGED    Details   ARIPiprazole (ABILIFY) 1 MG/ML SOLN solution Take 5 mg by mouth daily      calcium carbonate (TUMS) 500 MG chewable tablet Take 2 tablets by mouth daily      !!  Cholecalciferol (VITAMIN D3) 50 MCG (2000 UT) CAPS Take by mouth      !! Cholecalciferol (D3-50 PO) Take 5,000 Units by mouth daily      FLUoxetine (PROZAC) 20 MG/5ML solution Take by mouth daily      Iron Polysacch Ccyfm-Y20--0.025-1 MG TABS Take by mouth      LORazepam (ATIVAN) 2 MG/ML injection Infuse 2 mg intravenously daily as needed. Give 0.13 ml by mouth      magnesium oxide (MAG-OX) 400 MG tablet Take 400 mg by mouth daily      mirtazapine (REMERON) 7.5 MG tablet Take 7.5 mg by mouth nightly      polyethylene glycol (GLYCOLAX) 17 g packet Take 17 g by mouth daily as needed for Constipation      sucralfate (CARAFATE) 1 GM tablet Take 1 g by mouth 3 times daily (before meals)      valsartan (DIOVAN) 160 MG tablet Take 160 mg by mouth daily       ! ! - Potential duplicate medications found. Please discuss with provider. ALLERGIES     Buspar [buspirone], Ciprofloxacin, Food, and Januvia [sitagliptin]    FAMILY HISTORY      History reviewed. No pertinent family history. SOCIAL HISTORY       Social History     Socioeconomic History    Marital status:      Spouse name: None    Number of children: None    Years of education: None    Highest education level: None   Occupational History    None   Tobacco Use    Smoking status: Unknown If Ever Smoked    Smokeless tobacco: Never Used   Substance and Sexual Activity    Alcohol use: Not Currently    Drug use: Not Currently    Sexual activity: None   Other Topics Concern    None   Social History Narrative    None     Social Determinants of Health     Financial Resource Strain:     Difficulty of Paying Living Expenses:    Food Insecurity:     Worried About Running Out of Food in the Last Year:     Ran Out of Food in the Last Year:    Transportation Needs:     Lack of Transportation (Medical):      Lack of Transportation (Non-Medical):    Physical Activity:     Days of Exercise per Week:     Minutes of Exercise per Session:    Stress:     Feeling of Stress :    Social Connections:     Frequency of Communication with Friends and Family:     Frequency of Social Gatherings with Friends and Family:     Attends Adventism Services:     Active Member of Clubs or Organizations:     Attends Club or Organization images are visualized and preliminarily interpreted by the emergency physician with the below findings:    Impression   1. The endotracheal tube tip is 2-3 cm above the tariq. 2. The tip of the enteric tube is probably in a hiatal hernia as it remains   above the diaphragm and is partially coiled.      ED BEDSIDE ULTRASOUND:   Performed by ED Physician - none    LABS:  Labs Reviewed   CBC WITH AUTO DIFFERENTIAL - Abnormal; Notable for the following components:       Result Value    WBC 13.4 (*)     Hemoglobin 11.7 (*)     Hematocrit 35.3 (*)     RDW 16.9 (*)     Neutrophils Absolute 11.7 (*)     Lymphocytes Absolute 0.7 (*)     All other components within normal limits    Narrative:     Performed at:  [address] [place] [address]   Phone [phone]   BASIC METABOLIC PANEL W/ REFLEX TO MG FOR LOW K - Abnormal; Notable for the following components:    Sodium 130 (*)     Chloride 93 (*)     CO2 20 (*)     Anion Gap 17 (*)     Glucose 107 (*)     All other components within normal limits    Narrative:     Performed at:  [address] [employer] [employer] [address]   Phone [phone]   BRAIN NATRIURETIC PEPTIDE - Abnormal; Notable for the following components:    Pro- (*)     All other components within normal limits    Narrative:     Performed at:  [address] [employer] [address]   Phone [phone]   BLOOD GAS, VENOUS - Abnormal; Notable for the following components:    pCO2, Jase 36.8 (*)     HCO3, Venous 22 (*)     All other components within normal limits    Narrative:     Performed at:  [address] [employer] [address]   Phone [phone]   CULTURE, BLOOD 1   CULTURE, BLOOD 1   MRSA DNA PROBE, NASAL   TROPONIN    Narrative:     Performed at:  [facility]  [address] [facility] [address]   Phone [phone]   LACTIC ACID, PLASMA    Narrative:     Performed at:  [facility]  [address] [place], [place] [address]   Phone [phone]   [id number]   BASIC METABOLIC PANEL W/ REFLEX TO MG FOR LOW K   CBC WITH AUTO DIFFERENTIAL   PROCALCITONIN       All other labs were withinnormal range or not returned as of this dictation. EMERGENCY DEPARTMENT COURSE and DIFFERENTIAL DIAGNOSIS/MDM:     PMH, Surgical Hx, FH, Social Hx reviewed by myself (ETOH usage, Tobacco usage, Drug usage reviewed by myself, no pertinent Hx)- No Pertinent Hx     Old records were reviewed by me     76 yr old from NH with hypoxic resp distress. Intubated after consent from daughter. Central line after consent from daughter. Has food and aspiration that was removed during intubation. Probably aspiration PNA the cause of her hypoxia. Fluids and IV abx started (Vanc, Unasyn, cefepime). Daughter wants her to be DNR CCA now but is ok with intubation. I placed the DNR limited order. Admission to ICU. CRITICAL CARE TIME   Total Critical Caretime was 99 minutes, excluding separately reportable procedures. There was a high probability of clinically significant/life threatening deterioration in the patient's condition which required my urgent intervention.         PROCEDURES:  Intubation    Date/Time: 8/19/2021 6:45 AM  Performed by: Beth Cox MD  Authorized by: Beth Cox MD     Consent:     Consent obtained:  Verbal    Consent given by:  Guardian    Risks discussed:  Aspiration, bleeding, death, brain injury, dental trauma, hypoxia, pneumothorax and laryngeal injury    Alternatives discussed:  No treatment  Pre-procedure details:     Patient status:  Unresponsive    Pretreatment meds: Etomidate     Paralytics:  Succinylcholine  Procedure details:     Preoxygenation:  Bag valve mask    Intubation method:  Oral    Oral intubation technique:  Direct    Laryngoscope blade: Mac 3    Tube size (mm):  7.5    Tube type:  Cuffed    Number of attempts:  1    Cricoid pressure: yes      Tube visualized through cords: yes    Placement assessment:     Tube secured with:  ETT dinero    Breath sounds:  Equal    Placement verification: chest rise      CXR findings:  ETT in proper place  Post-procedure details:     Patient tolerance of procedure: Tolerated well, no immediate complications  Central Line    Date/Time: 8/19/2021 6:47 AM  Performed by: Nahomi Krishnan MD  Authorized by: Nahomi Krishnan MD     Consent:     Consent obtained:  Verbal    Consent given by:  Guardian    Risks discussed:  Arterial puncture, bleeding, infection, incorrect placement, nerve damage and pneumothorax    Alternatives discussed:  No treatment  Pre-procedure details:     Hand hygiene: Hand hygiene performed prior to insertion      Sterile barrier technique: All elements of maximal sterile technique followed      Skin preparation:  2% chlorhexidine  Sedation:     Sedation type: Moderate (conscious) sedation  Anesthesia (see MAR for exact dosages): Anesthesia method:  Local infiltration    Local anesthetic:  Lidocaine 1% w/o epi  Procedure details:     Location:  R femoral    Patient position:  Flat    Procedural supplies:  Triple lumen    Catheter size:  7.5 Fr    Landmarks identified: yes      Ultrasound guidance: yes      Sterile ultrasound techniques: Sterile gel and sterile probe covers were used      Successful placement: yes    Post-procedure details:     Post-procedure:  Dressing applied    Assessment:  Blood return through all ports    Patient tolerance of procedure: Tolerated well, no immediate complications  Comments:      EBL 10 cc      FINAL IMPRESSION      1. Acute respiratory failure with hypoxia ([address]    2.  Septicemia [facility])          DISPOSITION/PLAN   DISPOSITION Admitted 08/19/2021 03:38:03 AM    (Please note that portions ofthis note were completed with a

## 2021-08-19 NOTE — CARE COORDINATION
INITIAL CASE MANAGEMENT ASSESSMENT    Patient with confusion. Call to patient's daughter, Louis Ovalle [phone], to assess possible discharge needs. Explained Case Management role/services. Living Situation: confirmed address, lives with daughter in a home with 1 step to enter    ADLs: dependent - daughter provides 24 hr care, she is a nurse     DME: daughter denies needs stating she has everything she needs     PT/OT Recs: N/A at this time     Active Services: none - was at [facility] skilled     Transportation: daughter transports     Medications: confirmed [employer], uses [facility] on [address] without issues voiced    PCP: confirmed April Owens      HD/PD: N/A    PLAN/COMMENTS: Daughter states the patient was at [facility] skilled care. The daughter is a nurse and has not been working since December as she has been taking care of her mother 24 hrs per day. She recently had her mother go to [facility] and was hoping to be able to go back to work, but she wants to take her mother home and will eventually enroll her with Hosp[facility] [address] [place] [address]. She wants speech therapy to evaluate and is hoping the patient can get a MBS while she is here to evaluate for a possible PEG tube. She declines a referral to [address] [place] [address] at this time. She inquired on applying for Medicaid. She states that she was denied in the past.  Advised this is usually done online, but would ask financial counselor to reach out to her. Provided contact information for patient or family to call with any questions. Will follow and assist as needed.     Tyrese White RN, BSN, Case Management  [phone]  Electronically signed by Tyrese White RN on 8/19/2021 at 3:29 PM

## 2021-08-19 NOTE — PROGRESS NOTES
50mg/50ml  of midazolam wasted per [facility] verified by two RNs present at time of waste.  Witnessed by the two RN's below: Rudell Opitz and Regis BHAKTA     Electronically signed by Dang Servin RN on 8/19/2021 at 2:09 PM

## 2021-08-19 NOTE — ED NOTES
Propofol titrated due to pt activity and attempts to pull tube.  Pt blood pressure stable for titration     Manav Rivera RN  08/19/21 0440

## 2021-08-19 NOTE — ED NOTES
Bed: [facility]  Expected date:   Expected time:   Means of arrival:   Comments:  [facility] sob [address] [facility]  08/19/21 7531

## 2021-08-19 NOTE — PROGRESS NOTES
Speech Language Pathology    Swallow evaluation order received. Patient currently lethargic with limited ability to participate in exam at this time. ST to re-attempt as schedule permits unless otherwise notified. Thank you. Keena Mendez [facility], [id number]  Speech-Language Pathologist  Portable phone: [phone]

## 2021-08-19 NOTE — PROGRESS NOTES
Speech Language Pathology    Speech Therapy note regarding SLP orders for Clinical Evaluation of Swallowing    · This SLP did discuss with RN who states family has reported problems with swallowing and aspiration with reported recent test. Pt is currently in covid 19 r/o precautions. · This SLP reviewed chart and did discuss with pt's daughter. Chart review did confirm Esophagram 97/27/2021) and recent EGD 7/28/2021; 7/30/2021; 8/2/2021. Dtr reporting first EGD aborted due to difficulty passing scope. She reported 2nd and 3rd went a little better per GI report but persistent problems at meals. Dtr reports diet has progressively been downgraded from regular to mechanical soft to puree due to problems. Dtr reporting while pt was at [facility] discussions of Hospice and PEG tube. This SLP discussed with RN. RN discussed with MD.   Damian Anderson held this date. Plan for 8/20/2021 unless otherwise notified. Sean Lanes Flum,MS,CCC,SLP [id number]  Speech and Language Pathologist  [phone]

## 2021-08-19 NOTE — PROGRESS NOTES
6328- Patient admitted to room 2110. RT and ED RN at bedside for transfer. ST on telemetry. Propofol infusing via CVC, soft wrist restraints in place and secured. Pt responsive to pain, PERRL, and sifuentes draining clear yellow urine. 0630- Nurse called from [facility] [address] [employer]. Verified allergies. Per nurse, pt was found coughing, hypoxic with food on her shirt. Pt was on pureed diet with nectar thick liquids as she has an esophageal stricture awaiting stretching procedure. Nurse states that she assists pt with ADLs and that she does not speak to staff, only to her daughter. 0730- Shift handoff completed with Regis RN at bedside.

## 2021-08-19 NOTE — PLAN OF CARE
Problem: Non-Violent Restraints  Goal: Removal from restraints as soon as assessed to be safe  Outcome: Met This Shift  Note: Pt removed from restraints at time of extubation  Goal: No harm/injury to patient while restraints in use  Outcome: Met This Shift  Goal: Patient's dignity will be maintained  Outcome: Met This Shift     Problem: Nutrition  Goal: Optimal nutrition therapy  8/19/2021 1823 by Leeann Gray RN  Outcome: Not Met This Shift  Note: Speech eval pending. Pt is unable to tolerate PO at this time. Discussion about PEG tube vs hospice with family in the morning. 8/19/2021 1241 by Dominic Renteria RD, LD  Outcome: Ongoing     Problem: Pain:  Goal: Pain level will decrease  Description: Pain level will decrease  Outcome: Ongoing  Note: Pt asked to squeeze hand if in pain and pt did not squeeze hand during any assessment. No other signs of pain at this time.    Goal: Control of acute pain  Description: Control of acute pain  Outcome: Ongoing  Goal: Control of chronic pain  Description: Control of chronic pain  Outcome: Ongoing

## 2021-08-19 NOTE — PROGRESS NOTES
Clinical Pharmacy Note  Renal Dose Adjustment    Rebeca Smith is receiving Famotidine. This medication is renally eliminated. Based on the patient's est CrCl of 28ml/min and urine output, the dose has been adjusted to 20mg daily per protocol. Pharmacy will continue to monitor and adjust dose as needed for changes in renal function.     Mariah Vitale, [facility], [address] 8/19/2021 8:34 AM

## 2021-08-19 NOTE — ED NOTES
Pt suctioned of secretions in her mouth. Intubated with 7.5 ETT tube, 21cm at lip. Bilateral chest rise, color change confirmed.       Pamela Keating RN  08/19/21 8237

## 2021-08-19 NOTE — PROGRESS NOTES
Comprehensive Nutrition Assessment    Type and Reason for Visit:  Initial    Nutrition Recommendations/Plan:   Monitor for SLP eval to advance diet. Start appropriate ONS when diet advances. Pt meets criteria for Moderate Malnutrition AEB >10% wt loss over 6 months and moderate fat and muscle wasting. Nutrition Assessment:  Pt presented with Respiratory distress iwth hypoxemia. Pt was intubated. Pt with possible aspiration. Pt had SBT today and was extubated. Pt currently in [facility] for rule out. Pt NPO until SLP sees but pt was lethargic today. PT with significant wt loss over the past 6 months. Able to visually see pt with wasting through window. Will start appropriate nutrition supplement once SLP evaluates. Malnutrition Assessment:  Malnutrition Status:   Moderate malnutrition    Context:  Chronic Illness     Findings of the 6 clinical characteristics of malnutrition:  Energy Intake:  Unable to assess  Weight Loss:  7 - Greater than 10% over 6 months     Body Fat Loss:   (moderate) Orbital, Buccal region   Muscle Mass Loss:   (moderate) Temples (temporalis)  Fluid Accumulation:  No significant fluid accumulation     Strength:  Not Performed    Estimated Daily Nutrient Needs:  Energy (kcal):  2995-4804 kcal( 30-35 kcal/kg 36 kg CBW)  Protein (g):  36-47 gm (1-1.3gm/kg 36 kg CBW)  Fluid (ml/day):  1 mL/kcal    Nutrition Related Findings:  No BM or edema noted      Wounds:  None       Current Nutrition Therapies:    Diet NPO    Anthropometric Measures:  · Height: 4' 10\" (147.3 cm)  · Current Body Weight: 81 lb (36.7 kg)   · Admission Body Weight: 84 lb (38.1 kg)      · Ideal Body Weight: 90 lbs; % Ideal Body Weight 90 %   · BMI: 16.9  · BMI Categories: Underweight (BMI less than 22) age over 72       Nutrition Diagnosis:   · Moderate malnutrition related to inadequate protein-energy intake as evidenced by moderate muscle loss, moderate loss of subcutaneous fat, weight loss greater than or equal to 10% in 6 months      Nutrition Interventions:   Food and/or Nutrient Delivery:   (Monitor for start of nutrition)  Nutrition Education/Counseling:  No recommendation at this time   Coordination of Nutrition Care:  Continue to monitor while inpatient    Goals:  Initiate most appropriate form of nutrition       Nutrition Monitoring and Evaluation:   Food/Nutrient Intake Outcomes:  Diet Advancement/Tolerance  Physical Signs/Symptoms Outcomes:  Biochemical Data, Weight, Skin, Nutrition Focused Physical Findings     Discharge Planning:     Too soon to determine     Electronically signed by Balaji Ernandez RD, LD on 8/19/21 at 12:39 PM EDT    Contact: [phone]

## 2021-08-19 NOTE — ED NOTES
Decision to intubate pt.    Etomidate 20 mg IVP by Magaly Dubon RN  Succ 60 mg IVP by LIVIA Trammell RN  08/19/21 7719

## 2021-08-19 NOTE — PROGRESS NOTES
1020: Pt extubated per orders by RT. Pt is able to nod, squeeze hands, and move all extremities to command. Pt is nonverbal at this time. Tracks eyes to command. After extubation pt is saturating 100% on 5L NC. Pt is breathing unlabored with clear bilateral breath sounds. RT at the bedside for this duration. Will continue to wean supplemental O2 as tolerated.     Electronically signed by Kaylen Merino RN on 8/19/2021 at 10:25 AM

## 2021-08-19 NOTE — ACP (ADVANCE CARE PLANNING)
Advance Care Planning     Advance Care Planning Activator (Inpatient)  Conversation Note      Date of ACP Conversation: 8/19/2021     Conversation Conducted with:  Healthcare Decision Maker: Next of Kin by law (only applies in absence of above) (name) daughter - she states she also is HCPOA, but no paperwork on file    ACP Activator[facility]     Current Designated Health Care Decision Maker:     Primary Decision Maker: Sarika Thompson - [phone]  Click here to [address] including section of the Healthcare Decision Maker Relationship (ie \"Primary\")  Today we documented Decision Maker(s) consistent with Legal Next of Kin hierarchy. Patient has another child, but     Care Preferences    Ventilation: \"If you were in your present state of health and suddenly became very ill and were unable to breathe on your own, what would your preference be about the use of a ventilator (breathing machine) if it were available to you? \"      Would the patient desire the use of ventilator (breathing machine)?: yes    \"If your health worsens and it becomes clear that your chance of recovery is unlikely, what would your preference be about the use of a ventilator (breathing machine) if it were available to you? \"     Would the patient desire the use of ventilator (breathing machine)?: No      Resuscitation  \"CPR works best to restart the heart when there is a sudden event, like a heart attack, in someone who is otherwise healthy. Unfortunately, CPR does not typically restart the heart for people who have serious health conditions or who are very sick. \"    \"In the event your heart stopped as a result of an underlying serious health condition, would you want attempts to be made to restart your heart (answer \"yes\" for attempt to resuscitate) or would you prefer a natural death (answer \"no\" for do not attempt to resuscitate)? \" no       [x] Yes   [] No   Educated Patient / Austin Tavarez regarding differences between Advance Directives and portable DNR orders.     Length of ACP Conversation in minutes: 4     Conversation Outcomes:  [x] ACP discussion completed  [] Existing advance directive reviewed with patient; no changes to patient's previously recorded wishes  [] New Advance Directive completed  [] Portable Do Not Rescitate prepared for Provider review and signature  [] POLST/POST/MOLST/MOST prepared for Provider review and signature      Follow-up plan:    [] Schedule follow-up conversation to continue planning  [x] Referred individual to Provider for additional questions/concerns   [] Advised patient/agent/surrogate to review completed ACP document and update if needed with changes in condition, patient preferences or care setting    [x] This note routed to one or more involved healthcare providers               Tess Carter RN, BSN, Case Management  Phone: [phone]  Electronically signed by Tess Carter RN on 8/19/2021 at 3:55 PM

## 2021-08-20 ENCOUNTER — APPOINTMENT (OUTPATIENT)
Dept: GENERAL RADIOLOGY | Age: 76
DRG: 871 | End: 2021-08-20
Payer: MEDICARE

## 2021-08-20 LAB
ANION GAP SERPL CALCULATED.3IONS-SCNC: 13 MMOL/L (ref 3–16)
BASOPHILS ABSOLUTE: 0.1 K/UL (ref 0–0.2)
BASOPHILS RELATIVE PERCENT: 0.9 %
BUN BLDV-MCNC: 14 MG/DL (ref 7–20)
CALCIUM SERPL-MCNC: 8.7 MG/DL (ref 8.3–10.6)
CHLORIDE BLD-SCNC: 96 MMOL/L (ref 99–110)
CO2: 28 MMOL/L (ref 21–32)
CREAT SERPL-MCNC: 0.9 MG/DL (ref 0.6–1.2)
EOSINOPHILS ABSOLUTE: 0.1 K/UL (ref 0–0.6)
EOSINOPHILS RELATIVE PERCENT: 1.3 %
FERRITIN: 701.3 NG/ML (ref 15–150)
FOLATE: >20 NG/ML (ref 4.78–24.2)
GFR AFRICAN AMERICAN: >60
GFR NON-AFRICAN AMERICAN: >60
GLUCOSE BLD-MCNC: 124 MG/DL (ref 70–99)
GLUCOSE BLD-MCNC: 70 MG/DL (ref 70–99)
GLUCOSE BLD-MCNC: 71 MG/DL (ref 70–99)
GLUCOSE BLD-MCNC: 75 MG/DL (ref 70–99)
GLUCOSE BLD-MCNC: 85 MG/DL (ref 70–99)
GLUCOSE BLD-MCNC: 96 MG/DL (ref 70–99)
GLUCOSE BLD-MCNC: 98 MG/DL (ref 70–99)
HCT VFR BLD CALC: 23.8 % (ref 36–48)
HEMOGLOBIN: 8.2 G/DL (ref 12–16)
IRON SATURATION: 12 % (ref 15–50)
IRON: 18 UG/DL (ref 37–145)
LYMPHOCYTES ABSOLUTE: 1.1 K/UL (ref 1–5.1)
LYMPHOCYTES RELATIVE PERCENT: 12.3 %
MAGNESIUM: 2.3 MG/DL (ref 1.8–2.4)
MCH RBC QN AUTO: 29.8 PG (ref 26–34)
MCHC RBC AUTO-ENTMCNC: 34.6 G/DL (ref 31–36)
MCV RBC AUTO: 86.3 FL (ref 80–100)
MONOCYTES ABSOLUTE: 0.7 K/UL (ref 0–1.3)
MONOCYTES RELATIVE PERCENT: 8.4 %
MRSA SCREEN RT-PCR: NORMAL
NEUTROPHILS ABSOLUTE: 6.7 K/UL (ref 1.7–7.7)
NEUTROPHILS RELATIVE PERCENT: 77.1 %
PDW BLD-RTO: 17 % (ref 12.4–15.4)
PERFORMED ON: ABNORMAL
PERFORMED ON: NORMAL
PHOSPHORUS: 3.5 MG/DL (ref 2.5–4.9)
PLATELET # BLD: 296 K/UL (ref 135–450)
PMV BLD AUTO: 8.4 FL (ref 5–10.5)
POTASSIUM REFLEX MAGNESIUM: 3.5 MMOL/L (ref 3.5–5.1)
PROCALCITONIN: 0.41 NG/ML (ref 0–0.15)
RBC # BLD: 2.76 M/UL (ref 4–5.2)
SODIUM BLD-SCNC: 137 MMOL/L (ref 136–145)
TOTAL IRON BINDING CAPACITY: 155 UG/DL (ref 260–445)
VANCOMYCIN RANDOM: 7.3 UG/ML
VITAMIN B-12: >2000 PG/ML (ref 211–911)
WBC # BLD: 8.7 K/UL (ref 4–11)

## 2021-08-20 PROCEDURE — 6360000002 HC RX W HCPCS: Performed by: PHYSICIAN ASSISTANT

## 2021-08-20 PROCEDURE — 84145 PROCALCITONIN (PCT): CPT

## 2021-08-20 PROCEDURE — 83550 IRON BINDING TEST: CPT

## 2021-08-20 PROCEDURE — 36592 COLLECT BLOOD FROM PICC: CPT

## 2021-08-20 PROCEDURE — 83735 ASSAY OF MAGNESIUM: CPT

## 2021-08-20 PROCEDURE — 1200000000 HC SEMI PRIVATE

## 2021-08-20 PROCEDURE — 74230 X-RAY XM SWLNG FUNCJ C+: CPT

## 2021-08-20 PROCEDURE — 2580000003 HC RX 258: Performed by: PHYSICIAN ASSISTANT

## 2021-08-20 PROCEDURE — 82746 ASSAY OF FOLIC ACID SERUM: CPT

## 2021-08-20 PROCEDURE — 6360000002 HC RX W HCPCS: Performed by: NURSE PRACTITIONER

## 2021-08-20 PROCEDURE — 84100 ASSAY OF PHOSPHORUS: CPT

## 2021-08-20 PROCEDURE — C9113 INJ PANTOPRAZOLE SODIUM, VIA: HCPCS | Performed by: PHYSICIAN ASSISTANT

## 2021-08-20 PROCEDURE — 83540 ASSAY OF IRON: CPT

## 2021-08-20 PROCEDURE — 92526 ORAL FUNCTION THERAPY: CPT

## 2021-08-20 PROCEDURE — 2580000003 HC RX 258: Performed by: INTERNAL MEDICINE

## 2021-08-20 PROCEDURE — 6360000002 HC RX W HCPCS: Performed by: INTERNAL MEDICINE

## 2021-08-20 PROCEDURE — 2500000003 HC RX 250 WO HCPCS: Performed by: NURSE PRACTITIONER

## 2021-08-20 PROCEDURE — 94760 N-INVAS EAR/PLS OXIMETRY 1: CPT

## 2021-08-20 PROCEDURE — 92610 EVALUATE SWALLOWING FUNCTION: CPT

## 2021-08-20 PROCEDURE — 6370000000 HC RX 637 (ALT 250 FOR IP): Performed by: NURSE PRACTITIONER

## 2021-08-20 PROCEDURE — 82607 VITAMIN B-12: CPT

## 2021-08-20 PROCEDURE — 80202 ASSAY OF VANCOMYCIN: CPT

## 2021-08-20 PROCEDURE — 82728 ASSAY OF FERRITIN: CPT

## 2021-08-20 PROCEDURE — 85025 COMPLETE CBC W/AUTO DIFF WBC: CPT

## 2021-08-20 PROCEDURE — 80048 BASIC METABOLIC PNL TOTAL CA: CPT

## 2021-08-20 PROCEDURE — 99233 SBSQ HOSP IP/OBS HIGH 50: CPT | Performed by: INTERNAL MEDICINE

## 2021-08-20 PROCEDURE — 92611 MOTION FLUOROSCOPY/SWALLOW: CPT

## 2021-08-20 RX ORDER — SODIUM CHLORIDE 9 MG/ML
10 INJECTION INTRAVENOUS 2 TIMES DAILY
Status: DISCONTINUED | OUTPATIENT
Start: 2021-08-20 | End: 2021-08-24

## 2021-08-20 RX ORDER — PANTOPRAZOLE SODIUM 40 MG/10ML
40 INJECTION, POWDER, LYOPHILIZED, FOR SOLUTION INTRAVENOUS 2 TIMES DAILY
Status: DISCONTINUED | OUTPATIENT
Start: 2021-08-20 | End: 2021-08-24

## 2021-08-20 RX ADMIN — Medication 10 ML: at 15:02

## 2021-08-20 RX ADMIN — CEFEPIME HYDROCHLORIDE 2000 MG: 2 INJECTION, POWDER, FOR SOLUTION INTRAVENOUS at 15:05

## 2021-08-20 RX ADMIN — CEFEPIME HYDROCHLORIDE 2000 MG: 2 INJECTION, POWDER, FOR SOLUTION INTRAVENOUS at 02:57

## 2021-08-20 RX ADMIN — HEPARIN SODIUM 5000 UNITS: 5000 INJECTION INTRAVENOUS; SUBCUTANEOUS at 10:33

## 2021-08-20 RX ADMIN — SODIUM CHLORIDE, PRESERVATIVE FREE 10 ML: 5 INJECTION INTRAVENOUS at 07:42

## 2021-08-20 RX ADMIN — DEXTROSE MONOHYDRATE 12.5 G: 25 INJECTION, SOLUTION INTRAVENOUS at 05:18

## 2021-08-20 RX ADMIN — Medication 10 ML: at 21:56

## 2021-08-20 RX ADMIN — PANTOPRAZOLE SODIUM 40 MG: 40 INJECTION, POWDER, FOR SOLUTION INTRAVENOUS at 21:56

## 2021-08-20 RX ADMIN — Medication 2 CAPSULE: at 18:21

## 2021-08-20 RX ADMIN — PANTOPRAZOLE SODIUM 40 MG: 40 INJECTION, POWDER, FOR SOLUTION INTRAVENOUS at 15:02

## 2021-08-20 RX ADMIN — MUPIROCIN: 20 OINTMENT TOPICAL at 07:42

## 2021-08-20 RX ADMIN — VANCOMYCIN HYDROCHLORIDE 750 MG: 750 INJECTION, POWDER, LYOPHILIZED, FOR SOLUTION INTRAVENOUS at 10:55

## 2021-08-20 ASSESSMENT — PAIN SCALES - PAIN ASSESSMENT IN ADVANCED DEMENTIA (PAINAD)
FACIALEXPRESSION: 0
CONSOLABILITY: 0
BODYLANGUAGE: 0
BODYLANGUAGE: 0
FACIALEXPRESSION: 0
BREATHING: 0
BREATHING: 0
NEGVOCALIZATION: 0
CONSOLABILITY: 0
NEGVOCALIZATION: 0
TOTALSCORE: 0
CONSOLABILITY: 0
BREATHING: 0
FACIALEXPRESSION: 0
NEGVOCALIZATION: 0
BODYLANGUAGE: 0
BREATHING: 0
CONSOLABILITY: 0
TOTALSCORE: 0
FACIALEXPRESSION: 0
BODYLANGUAGE: 0
TOTALSCORE: 0
NEGVOCALIZATION: 0
TOTALSCORE: 0

## 2021-08-20 ASSESSMENT — PAIN SCALES - GENERAL
PAINLEVEL_OUTOF10: 0

## 2021-08-20 NOTE — PROCEDURES
INSTRUMENTAL SWALLOW REPORT  MODIFIED BARIUM SWALLOW    NAME: Sarah Mc   :   MRN: [id number]       Date of Eval: 2021     Ordering Physician: Nathanael Navarro; clarified Dr. Catherine Rollins in agreement with MBS  Radiologist: Tess Khan     Referring Diagnosis: oropharyngeal dysphagia; r 13.12    Sarah Mc has a past medical history of Anemia, Catatonic state, Dementia ([address] Depression, and Hypertension. She has no past surgical history on file. Current Diet Solid Consistency: NPO  Current Diet Liquid Consistency: NPO    CHART REVIEW:  2021 admitted with respiratory failure: ADMISSION H&P HPI: The patient is a 67 y. o. female with hx depression, dementia, HTN, and anemia who presents to [facility] with respiratory failure. Patient is currently intubated and sedated and unable to provide any history. According to the notes, patient was having shortness of breath with congestion for the past two days at [facility]. She was saturating 76% on 5L at the nursing home and only responded up to 84% on a nonrebreather. She arrived in the ED in respiratory distress and the ED physician was concerned for aspiration. The daughter was there and stated she was a Limited Code but ok with intubation and so she was intubated in the ED for respiratory failure. In the ED, labs were significant for a sodium of 130, chloride of 93, biacrb of 19, WBC count of 13.4K. VBG showed a pH of 7.391 and pCO2 of 36.8. CXR showed bronchial thickening and interstitial airspace disease in the left infrahilar region and right lung base laterally. [place] is pending at the time of admission.      2021 intubated but extubated same day     2021 COVID-19 not detected     2021 MBS ordered.  Palliative Care notes:  Pt was enrolled in [facility] [address] [place] [address] discharged on 21 per family request to seek more aggressive care. Daughter Roberto Dye wants MBS and PEG if needed then DC home with her and Hospice support.         DYSPHAGIA HISTORY:  EMR review limited  Per 8/19/2021 SLP note: This SLP did discuss with RN who states family has reported problems with swallowing and aspiration with reported recent test. Pt is currently in covid 19 r/o precautions. This SLP reviewed chart and did discuss with pt's daughter. Chart review did confirm Esophagram 97/27/2021) and recent EGD 7/28/2021; 7/30/2021; 8/2/2021.  Dtr reporting first EGD aborted due to difficulty passing scope. She reported 2nd and 3rd went a little better per GI report but persistent problems at meals. Dtr reports diet has progressively been downgraded from regular to mechanical soft to puree due to problems. Dtr reporting while pt was at [facility] discussions of Hospice and PEG tube. Patient Complaints/Reason for Referral:  · Emperatriz Box was referred for a MBS to assess the efficiency of his/her swallow function, assess for aspiration, and to make recommendations regarding safe dietary consistencies, effective compensatory strategies, and safe eating environment. · Patient complaints: patient unable generate complaint; chart review indicates family report for significant esophageal dysphagia with request for assessment of oropharyngeal dysphagia with plan to place PEG if deemed indicated    Onset of problem:   Date of Onset: 8/19/2021    Behavior/Cognition/Vision/Hearing:  Behavior/Cognition: Cooperative;Pleasant mood; Requires cueing; Alert  Vision:  (appears adeqaute for assessment needs)  Hearing:  (appears adeqaute for assessment needs)    Impressions:  Treatment Dx and ICD 10: oropharyngeal dysphagia; r 13.12    Patient Position: Lateral   Patient Degrees: Fully upright in VSE chair  Consistencies Administered: Nectar  teaspoon;Honey teaspoon;Honey cup;Dysphagia Pureed (Dysphagia I)    Marked oral stage dysphagia characterized by poor motor planning, decreased labial coordination, and decreased lingual manipulation for bolus prep.    · Mastication with textured solids not assessed d/t severity of observed imps with puree and liquids. · Poor PO acceptance via teaspoon and cup d/t reduced labial opening and rounding with most optimal mode of administration of PO noted to be via teaspoon. · Poor motor planning with oral holding of all boluses for > 1 minute with increasing duration of holding noted as study progressed. · Decreased oral transit with all. · Premature bolus loss to the pharynx with all. · Oral residue with all. · Patient not receptive to cueing for prep or residue clearance. · Labial and lingual tremor noted throughout study. Moderate-severe pharyngeal stage dysphagia characterized by delayed swallow, decreased laryngeal elevation, and decreased pharyngeal peristalsis. · Premature spillage to the valleculae with all. · Penetration of nectar via teaspoon is not cleared. Due to severity of oropharyngeal impairments, there is significant concern for poor PO diet tolerance even with most optimal diet of puree and honey thick via teaspoon. Marked oral phase impairments place patient at significant risk for inability to tolerate adequate level of PO nutrition with suspicion for increasing penetration/aspiration risks as patient fatigues. Recommend consideration for long term alternate nutrition with pleasure feeds of puree/honey vs review of goals of care and reconsideration for resumption of hospice care services.     Dysphagia Outcome Severity Scale: Level 2: Moderate Severe dysphagia- Maximum assistance or maximum use of strategies with partial PO only  Penetration-Aspiration Scale (PAS): 3 - Material enters the airway, remains above the vocal folds, and is not ejected from airway    Recommended Diet: pending MD decision:  Solid consistency: Dysphagia Pureed (Dysphagia I) vs NPO  Liquid consistency: Moderately Thick (Honey) vs NPO  Liquid administration via: Spoon;Cup  Medication administration: Meds in puree (crushed if able; most opitaml if admisnitered via alternative means to PO)  Supervision: 1:1  Compensatory Swallowing Strategies: Upright as possible for all oral intake;Remain upright for 30-45 minutes after meals;Eat/Feed slowly; Small bites/sips; DISCONTINUE PO IF SIGNS/SYMPTOSM OF PENETRATION/ASPIRATION/REDUCED TOELRANCE    Recommendations/Treatment  Requires SLP Intervention: Yes  Duration/Frequency of Treatment: ST to follow-up 2-4 times during acute admission  Therapeutic Interventions: Diet tolerance monitoring;Patient/Family education  D/C Recommendations: SNF    Education:   Patient Education: attempted on results/recs/plan  Patient Education Response: No evidence of learning    Prognosis  Prognosis for safe diet advancement: poor  Barriers to reach goals: cognitive deficits;severity of dysphagia    Goals:    Dysphagia Goals: The patient will tolerate recommended diet without observed clinical signs of aspiration; The patient/caregiver will demonstrate understanding of compensatory strategies for improved swallowing safety. Oral Preparation / Oral Phase  Oral Phase - Major Contributing Deficits  Poor Mastication:  (AYALA d/t severity of oral phase imps)  Weak Lingual Manipulation: All  Reduced Posterior Propulsion: All  Holding Of Bolus: All (> 1 minute each trial; increasing duration for oral holding as study progressed; patient not receptive to cueing/pacing)  Reduced Bolus Control: All  Lingual / Palatal Residue: All  Premature Bolus Loss to Pharynx: All    Pharyngeal Phase  Pharyngeal Phase - Major Contributing Deficits  Delayed Swallow Initiation: All  Premature Spillage to Valleculae:  All  Reduced Pharyngeal Peristalsis: All  Reduced Laryngeal Elevation: All  Shallow Penetration During: Nectar teaspoon (does not clear)    Upper Esophageal Phase  Esophageal Screen:  (limited screening - no apparent backflow/regurgitation nor dysmotility for upper 1/3 visible during study)    Pain   Patient Currently in Pain: No      Therapy Time: Individual   Time In 1200   Time Out 800 [facility], [address]  [facility], [address] [facility], [id number]  Speech-Language Pathologist  Portable phone: [phone]  8/20/2021, 12:30 PM

## 2021-08-20 NOTE — PROGRESS NOTES
Patient to room 3104 from the ICU. Vitals stable, family at the bedside. Call light and personal items in reach, bed alarm set.

## 2021-08-20 NOTE — CONSULTS
GASTROENTEROLOGY INPATIENT CONSULTATION        IDENTIFYING DATA/REASON FOR CONSULTATION   PATIENT:  Hugh Caballero  MRN:  [id number]  ADMIT DATE: 8/19/2021  TIME OF EVALUATION: 8/20/2021 11:57 AM  HOSPITAL STAY:   LOS: 1 day     REASON FOR CONSULTATION:  PEG placement    HISTORY OF PRESENT ILLNESS   Hugh Caballero is a 76 y.o. female with a PMH of dementia, HTN and prior Nissen fundoplication (2460) who presented on 8/19/2021 from [facility] with shortness of breath and hypoxia. She was intubated in the ED and admitted to ICU. She was started on antibiotics for aspiration pneumonia and sepsis. She was able to be extubated later that day. Speech therapy has been consulted and a modified barium esophagram performed today showed marked oral stage dysphagia, moderate to severe pharyngeal stage dysphagia. We have been consulted for PEG placement. Per chart notes patient was recently enrolled in hospice care but discharged from their services in June per family's request for more aggressive care. According to patient's daughter, Cinthya Infante, patient's appetite has been poor over the past year and she has lost approximately 20 to 35 pounds. She's had recent hospitalizations for significant anemia. She underwent an EGD and colonoscopy in February at [place]. EGD showed grade C esophagitis, large hiatal hernia and mild antral gastritis, no active bleeding. Biopsies were positive for H. pylori. Colonoscopy showed diverticulosis otherwise was normal without a bleeding source. She was hospitalized again in July with anemia but family at that time elected not to pursue endoscopic work-up. She received blood transfusions and was discharged to an ECF. She had a barium esophagram performed on 7/27/21 that showed severe narrowing at the GE junction limiting the passage of contrast into the stomach. It is unclear if patient has undergone a follow-up EGD for further evaluation.   Reports are not available in Care Everywhere and Gatherings with Friends and Family:     Attends Gnosticism Services:     Active Member of Clubs or Organizations:     Attends Club or Organization Meetings:     Marital Status:    Intimate Partner Violence:     Fear of Current or Ex-Partner:     Emotionally Abused:     Physically Abused:     Sexually Abused:        MEDICATIONS   SCHEDULED:  insulin lispro, 0-3 Units, Q4H  sodium chloride flush, 10 mL, 2 times per day  lactobacillus, 2 capsule, BID WC  mupirocin, , BID  cefepime, 2,000 mg, Q12H  ARIPiprazole, 5 mg, BID  FLUoxetine, 20 mg, BID  mirtazapine, 7.5 mg, Nightly  heparin (porcine), 5,000 Units, BID  valsartan, 160 mg, Daily  vancomycin (VANCOCIN) intermittent dosing (placeholder), , RX Placeholder      FLUIDS/DRIPS:     sodium chloride      dextrose       PRNs: sodium chloride flush, 10 mL, PRN  sodium chloride, 25 mL, PRN  ondansetron, 4 mg, Q4H PRN  polyethylene glycol, 17 g, Daily PRN  acetaminophen, 650 mg, Q4H PRN   Or  acetaminophen, 650 mg, Q4H PRN  glucose, 15 g, PRN  dextrose, 12.5 g, PRN  glucagon (rDNA), 1 mg, PRN  dextrose, 100 mL/hr, PRN      ALLERGIES:  She   Allergies   Allergen Reactions    Buspar [Buspirone]     Ciprofloxacin     Food      STRAWBERRIES     Januvia [Sitagliptin]        REVIEW OF SYSTEMS   Pertinent ROS noted in HPI    PHYSICAL EXAM     Vitals:    08/20/21 0803 08/20/21 0900 08/20/21 1000 08/20/21 1100   BP:  (!) 149/70 (!) 145/67 (!) 154/71   Pulse:  79 79 81   Resp: 14 14 16 14   Temp:       TempSrc:       SpO2: 99% 100% 99% 99%   Weight:       Height:           I/O last 3 completed shifts:   In: 191.6 [IV Piggyback:191.6]  Out: 7960 [Urine:1395]      Physical Exam:  General appearance: NAD, drowsy but able be aroused, minimally verbal  Eyes: Anicteric  Head: Normocephalic, without obvious abnormality  Lungs: clear to auscultation bilaterally, Normal Effort  Heart: regular rate and rhythm, normal S1 and S2, no murmurs or rubs  Abdomen: soft, non-distended, non-tender. Bowel sounds normal.   Extremities: atraumatic, no cyanosis or edema  Skin: warm and dry, no jaundice  Neuro: Grossly intact, Alert, confused      LABS AND IMAGING   Laboratory   Recent Labs     08/19/21  0208 08/19/21  0620 08/20/21  0435   WBC 13.4* 15.1* 8.7   HGB 11.7* 10.2* 8.2*   HCT 35.3* 30.6* 23.8*   MCV 87.3 87.3 86.3    366 296     Recent Labs     08/19/21  0208 08/19/21  0735 08/20/21  0435   * 128* 137   K 3.8 3.8 3.5   CL 93* 93* 96*   CO2 20* 17* 28   PHOS  --  3.5 3.5   BUN 19 19 14   CREATININE 0.7 0.8 0.9     No results for input(s): AST, ALT, ALB, BILIDIR, BILITOT, ALKPHOS in the last 72 hours. No results for input(s): LIPASE, AMYLASE in the last 72 hours. No results for input(s): PROTIME, INR in the last 72 hours. Imaging  XR CHEST PORTABLE   Final Result   1. The endotracheal tube tip is 2-3 cm above the tariq. 2. The tip of the enteric tube is probably in a hiatal hernia as it remains   above the diaphragm and is partially coiled. XR CHEST PORTABLE   Final Result   COPD, with bronchial thickening and interstitial airspace disease in the left   infrahilar region and right lung base laterally suspicious for superimposed   pneumonitis. FL MODIFIED BARIUM SWALLOW W VIDEO    (Results Pending)         ASSESSMENT AND RECOMMENDATIONS   76 y.o. female with a PMH of PMH of dementia, HTN and prior Nissen fundoplication (5354) who presented on 8/19/2021 from [facility] with acute respiratory failure 2/2 aspiration pneumonia. Required intubation but has since been extubated. MBS today showed oropharyngeal dysphagia. We have been consulted for PEG placement. IMPRESSION:  1.  Oropharyngeal dysphagia  -MBS notes marked oral stage dysphagia, moderate to severe pharyngeal stage dysphagia.  -Daughter wishes to pursue PEG placement  -Pt has history of Nissen fundoplication (4366) and a recent abnormal barium esophagram (7/27/21) noting severe narrowing at the [address] [place] [address] [employer] [facility] [employer] junction. She did have a recent EGD and February 2021 that did not show an obstructing stricture or mass lesion. Will discuss case with Dr. Zainab Barber. Please await his input and recommendations. 2. Abnormal barium esophagram (7/27/21) noting severe narrowing at the GE junction limiting the passage of contrast into the stomach. Findings possibly suggestive of achalasia vs ?post surgical changes of Nissen fundoplication? Recent EGD 2/2021 did not show a stricture or obstructing mass. Given patient's advanced dementia do not suspect she will tolerate manometry test.    3. 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      4. Aspiration pneumonia with respiratory failure  -Extubated.    -On cefepime and vanc    5. Anemia  -No overt bleeding. -EGD 2/2021: grade C esophagitis, large hiatal hernia and mild antral gastritis, no active bleeding. Biopsy +H pylori associated chronic active gastritis, without metaplasia. Prescribed H pylori triple therapy. Will check H. pylori stool antigen  -Colonoscopy 2/2021: left-sided diverticulosis, otherwise normal colon and terminal ileum without bleeding source  -We will place patient on PPI twice daily. Will check a H. pylori stool antigen. Monitor H&H and transfuse to keep hemoglobin greater than 7          If you have any questions or need any further information, please feel free to contact our consult team.  Thank you for allowing us to participate in the care of Chi Mae. The note was completed using Dragon voice recognition transcription. Every effort was made to ensure accuracy; however, inadvertent transcription errors may be present despite my best efforts to edit errors.       Asha Gurrola PA-C

## 2021-08-20 NOTE — CARE COORDINATION
[facility]  Hypoglycemia Event and Prevention Plan      NAME: Asim Abbasi  MEDICAL RECORD NUMBER:  [id number]  AGE: 76 y.o.    GENDER: female  : 1945  EPISODE DATE:  2021     Data     Recent Labs     21  2334 21  0215 21  0422 21  0549 21  0750   POCGLU 109* 78 75 71 124* 96       Medications  Scheduled Medications:   vancomycin  750 mg Intravenous Once    sodium chloride flush  10 mL Intravenous 2 times per day    lactobacillus  2 capsule Oral BID WC    mupirocin   Nasal BID    cefepime  2,000 mg Intravenous Q12H    insulin lispro  0-12 Units Subcutaneous Q4H    ARIPiprazole  5 mg Oral BID    FLUoxetine  20 mg Oral BID    mirtazapine  7.5 mg Oral Nightly    heparin (porcine)  5,000 Units Subcutaneous BID    valsartan  160 mg Oral Daily    vancomycin (VANCOCIN) intermittent dosing (placeholder)   Other RX Placeholder       Diet  Current diet/supplement order: Diet NPO     Recorded PO:   last meal in flowsheets      Action       Physician Notified of event: Yes   Jaky Shaver MD    Responce     Achieved POCT Blood Glucose greater than 70 mg/dl: Yes      Medication plan updated: Yes      Electronically signed by Suma Clemens RN on 2021 at 8:53 AM

## 2021-08-20 NOTE — PROGRESS NOTES
chloride flush, sodium chloride, ondansetron, polyethylene glycol, acetaminophen **OR** acetaminophen, glucose, dextrose, glucagon (rDNA), dextrose      Intake/Output Summary (Last 24 hours) at 8/20/2021 1603  Last data filed at 8/20/2021 1200  Gross per 24 hour   Intake 191.64 ml   Output 1320 ml   Net -1128.36 ml       Exam:    BP (!) 154/71   Pulse 82   Temp 98.6 °F (37 °C) (Axillary)   Resp 14   Ht 4' 10\" (1.473 m)   Wt 81 lb 5.6 oz (36.9 kg)   SpO2 99%   BMI 17.00 kg/m²     General appearance: Thin. Chronically ill appearing. No apparent distress, appears stated age and cooperative. HEENT: Pupils equal, round, and reactive to light. Conjunctivae/corneas clear. Neck: Supple, with full range of motion. No jugular venous distention. Trachea midline. Respiratory:  Normal respiratory effort. Clear to auscultation, bilaterally without Rales/Wheezes/Rhonchi. Cardiovascular: Regular rate and rhythm with normal S1/S2 without murmurs, rubs or gallops. Abdomen: Soft, non-tender, non-distended with normal bowel sounds. Musculoskeletal: No clubbing, cyanosis or edema bilaterally. Full range of motion without deformity. Skin: Skin color, texture, turgor normal.  No rashes or lesions. Neurologic:  Unable to cooperate with exam. Cranial nerves: II-XII intact, grossly non-focal.  Psychiatric: Nonverbal  Capillary Refill: Brisk,< 3 seconds   Peripheral Pulses: +2 palpable, equal bilaterally       Labs:   Recent Labs     08/19/21  0208 08/19/21  0620 08/20/21  0435   WBC 13.4* 15.1* 8.7   HGB 11.7* 10.2* 8.2*   HCT 35.3* 30.6* 23.8*    366 296     Recent Labs     08/19/21  0208 08/19/21  0735 08/20/21  0435   * 128* 137   K 3.8 3.8 3.5   CL 93* 93* 96*   CO2 20* 17* 28   BUN 19 19 14   CREATININE 0.7 0.8 0.9   CALCIUM 10.0 8.9 8.7   PHOS  --  3.5 3.5     No results for input(s): AST, ALT, BILIDIR, BILITOT, ALKPHOS in the last 72 hours. No results for input(s): INR in the last 72 hours.   Recent Labs 08/19/21  0208   TROPONINI <0.01       Urinalysis:    No results found for: Roberto Alan, BACTERIA, RBCUA, BLOODU, SPECGRAV, GLUCOSEU    Radiology:  FL MODIFIED BARIUM SWALLOW W VIDEO   Final Result   Penetration was seen. No definite aspiration      Please see separate speech pathology report for full discussion of findings   and recommendations. XR CHEST PORTABLE   Final Result   1. The endotracheal tube tip is 2-3 cm above the tariq. 2. The tip of the enteric tube is probably in a hiatal hernia as it remains   above the diaphragm and is partially coiled. XR CHEST PORTABLE   Final Result   COPD, with bronchial thickening and interstitial airspace disease in the left   infrahilar region and right lung base laterally suspicious for superimposed   pneumonitis.                  Assessment/Plan:    Active Hospital Problems    Diagnosis Date Noted    Acute aspiration pneumonia ([address] [J69.0] 08/19/2021    Moderate malnutrition ([address] [E44.0] 08/19/2021    Acute respiratory failure with hypoxia (HCC) [J96.01]        Shortness of breath likely 2/2 aspiration pneumonia  -given dose of Vanc and cefepime in the ED, continue with cefepime  -D-dimer minimally elevated     Sepsis 2/2 aspiration pneumonia  Meets at least 2 SIRS Criteria:  Tachypnea > 20  HR > 90  WBC > 12K  Source: lungs  -lactic acid q6h  -blood cultures x 2 drawn prior to administration of antibiotics  -IV antibiotics: cefepime  -IVF: 30 cc/kg given     Acute respiratory failure with hypoxia s/p mechanical intubation  -extubated 8/19, weaned to room air  -management as above  -wean oxygen to maintain SpO2 > 89%  -ventilator management per critical care     Anion gap metabolic acidosis  -bicarb gtt, stopped with improvement  -continue to trend and monitor    Oropharyngeal dysphagia  -family wants PEG tube placement  -GI consulted  -palliative care involved  -family wants hospice care at discharge     Depression  -continue home meds     Essential hypertension  -continue home meds     Dementia  -continue home meds     Hypomagnesemia  -replace PRN       DVT Prophylaxis: heparin  Diet: Diet NPO  Code Status: Limited    PT/OT Eval Status: ordered    Dispo - continue care    Raymond Castillo MD

## 2021-08-20 NOTE — PROGRESS NOTES
Clinical Pharmacy Note  Vancomycin Consult    Regis Rjaan is a 76 y.o. female ordered Vancomycin for pneumonia; consult received from Dr. Ayan Alva to manage therapy. Also receiving Cefepime. Patient Active Problem List   Diagnosis    Acute aspiration pneumonia ([address]    Acute respiratory failure with hypoxia (HCC)    Moderate malnutrition (HCC)       Allergies:  Buspar [buspirone], Ciprofloxacin, Food, and Januvia [sitagliptin]     Temp max:  Temp (24hrs), Av.6 °F (37 °C), Min:98 °F (36.7 °C), Max:99.4 °F (37.4 °C)      Recent Labs     21  0208 21  0620 21  0435   WBC 13.4* 15.1* 8.7       Recent Labs     21  0208 21  0735 21  0435   BUN  14   CREATININE 0.7 0.8 0.9         Intake/Output Summary (Last 24 hours) at 2021 1101  Last data filed at 2021 1656  Gross per 24 hour   Intake 191.64 ml   Output 1370 ml   Net -1178.36 ml       Culture Results:  Pending    Ht Readings from Last 1 Encounters:   21 4' 10\" (1.473 m)        Wt Readings from Last 1 Encounters:   21 81 lb 5.6 oz (36.9 kg)         CrCl cannot be calculated (Unknown ideal weight. ). Assessment/Plan:  Random Vancomycin level = 7.3 ug/mL. Will re-dose Vancomycin 750 mg IVPB x 1 dose now. Will repeat Vancomycin level on 21 with AM labs. Awaiting results of MRSA nasal - if negative will discontinue Vancomycin. Thank you for the consult. Will continue to follow.     Monika Farris RPH, PharmD, BCPS  2021 11:04 AM

## 2021-08-20 NOTE — CONSULTS
PALLIATIVE MEDICINE CONSULTATION     Patient name:Evelyn Xiao   QFT:[id number]    :1945  Room/Bed:W2I-7492-   LOS: 1 day         Date of consult:2021    Consult Information  Palliative Medicine Consult performed by: SCOUT Garg - CNP  CNP  Requested by: Dr Paula Hanna  Reason for consult: Stuart Nelson, code status    Number of admissions past 12 months: 3    ASSESSMENT/RECOMMENDATIONS     76 y.o. female with Dyspnea, AMS and dysphagia      Symptom Management:  Dyspnea- Pt extubated now and on room air  AMS- pt not speaking this am she has end stage dementia  Dysphagia- per family frequent aspiration  Goals of Care- Talked to daughter Arturo iPmentel she is hoping for MBS and PEG tube if needed and then to re-enroll in Hospice. She does not want any further intubation or CPR. Patient/Family Goals of Care :    Pt was enrolled in Hospice of [address] [place] [address] discharged on 21 per family request to seek more aggressive care. Daughter Arturo Pimentel wants MBS and PEG if needed then DC home with her and Hospice support. Disposition/Discharge Plan:   Pending    Advance Directives:  Surrogate Decision Maker: No HCPOA  Code status:  Limited    Case discussed with: patient, floor RN, Arturo Pimentel  Thank you for allowing us to participate in the care of this patient. HISTORY     CC: AMS  HPI: The patient is a 76 y.o. female with hx depression, dementia, HTN, and anemia who presents to [facility] with respiratory failure. Patient is currently intubated and sedated and unable to provide any history. Palliative Medicine SymptomScreening/ROS:  Review of Systems - History obtained from chart review and unobtainable from patient due to non-verbal    Patient unable to complete full ROS due to current cognitive status. Information that is obtained from nursing and chart.      Pain:    Home med list and hospital medications reviewed in chart as of 2021     EXAM     Vitals:    21 0803   BP:

## 2021-08-20 NOTE — PROGRESS NOTES
Pulmonary Progress Note    Date of Admission: 8/19/2021   LOS: 1 day       CC:  pneumonia     Subjective:  Extubated. 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 Will d/c vanc with MRSA probe negative. *continue cefepime    *extubated.      Metabolic acidosis with lactic acidosis  *  resolved. D/c bicarb.         Depression  *Restart home psychiatric medications        Anemia  *Close to baseline per July 2021 admission.     Electrolytes  *Hyponatremia  - Ca:  - Mg:  - Phos:     Prophylaxis  - GI - pepcid    - DVT -   heparin   - VAP - peridex  - C. Diff - culturelle   - Nasal Decolonization - Bactroban     Nutrition  - Diet NPO  -   Intake/Output Summary (Last 24 hours) at 8/20/2021 0836  Last data filed at 8/20/2021 2761  Gross per 24 hour   Intake 191.64 ml   Output 1395 ml   Net -1203.36 ml       Will sign off at this time. Thanks for the consult. Please call with questions. Data:        PHYSICAL EXAM:   Blood pressure (!) 145/66, pulse 87, temperature 98.6 °F (37 °C), temperature source Axillary, resp. rate 14, height 4' 10\" (1.473 m), weight 81 lb 5.6 oz (36.9 kg), SpO2 99 %.'  Body mass index is 17 kg/m². Gen: No distress. ENT:   Resp: No accessory muscle use. No crackles. No wheezes. No rhonchi. CV: Regular rate. Regular rhythm. No murmur or rub. No edema.    Skin: Warm, dry, normal texture and turgor. No nodule on exposed extremities. M/S: No cyanosis. No clubbing. No joint deformity. Psych: awake       Medications:    Scheduled Meds:   vancomycin  750 mg Intravenous Once    sodium chloride flush  10 mL Intravenous 2 times per day    lactobacillus  2 capsule Oral BID WC    mupirocin   Nasal BID    cefepime  2,000 mg Intravenous Q12H    insulin lispro  0-12 Units Subcutaneous Q4H    ARIPiprazole  5 mg Oral BID    FLUoxetine  20 mg Oral BID    mirtazapine  7.5 mg Oral Nightly    heparin (porcine)  5,000 Units Subcutaneous BID    valsartan  160 mg Oral Daily    vancomycin (VANCOCIN) intermittent dosing (placeholder)   Other RX Placeholder       Continuous Infusions:   sodium chloride      sodium bicarbonate in sterile water infusion 100 mL/hr at 08/19/21 2129    dextrose         PRN Meds:  sodium chloride flush, sodium chloride, ondansetron, polyethylene glycol, acetaminophen **OR** acetaminophen, glucose, dextrose, glucagon (rDNA), dextrose    Labs reviewed:  CBC:   Recent Labs     08/19/21  0208 08/19/21  0620 08/20/21  0435   WBC 13.4* 15.1* 8.7   HGB 11.7* 10.2* 8.2*   HCT 35.3* 30.6* 23.8*   MCV 87.3 87.3 86.3    366 296     BMP:   Recent Labs     08/19/21  0208 08/19/21  0735 08/20/21  0435   * 128* 137   K 3.8 3.8 3.5   CL 93* 93* 96*   CO2 20* 17* 28   PHOS  --  3.5 3.5   BUN 19 19 14   CREATININE 0.7 0.8 0.9     LIVER PROFILE: No results for input(s): AST, ALT, LIPASE, BILIDIR, BILITOT, ALKPHOS in the last 72 hours. Invalid input(s): AMYLASE,  ALB  PT/INR: No results for input(s): PROTIME, INR in the last 72 hours. APTT: No results for input(s): APTT in the last 72 hours. UA:No results for input(s): NITRITE, COLORU, PHUR, LABCAST, WBCUA, RBCUA, MUCUS, TRICHOMONAS, YEAST, BACTERIA, CLARITYU, SPECGRAV, LEUKOCYTESUR, UROBILINOGEN, BILIRUBINUR, BLOODU, GLUCOSEU, AMORPHOUS in the last 72 hours.     Invalid input(s): Ilana Nieto  No results for input(s): PH, PCO2, PO2 in the last 72 hours. Cx:      Films: This note was transcribed using [employer]. Please disregard any translational errors.       Alayna Smith Pulmonary, Sleep and [facility] [phone]

## 2021-08-20 NOTE — CARE COORDINATION
Patient being evaluated for possible PEG tube placement. Daughter plans to take her home with her at D/C. She did not want a hospice referral placed until the PEG has been placed.   She wants Hospice of [address] [place] [address] and states she can contact them once patient is home as she has already been enrolled with them in the past.    Minh Lovett RN, BSN, Case Management  Phone: [phone]  Electronically signed by Minh Lovett RN on 8/20/2021 at 1:57 PM

## 2021-08-20 NOTE — PROGRESS NOTES
Speech Language Pathology  Facility/Department: [address] ICU   CLINICAL BEDSIDE SWALLOW EVALUATION    NAME: Guy Nageotte  :   MRN: [id number]    ADMISSION DATE: 2021  ADMITTING DIAGNOSIS:  Acute respiratory failure with hypoxia ([address] [J96.01]  Acute aspiration pneumonia ([address] [J69.0]     Guy Nageotte has Acute aspiration pneumonia ([address] Acute respiratory failure with hypoxia ([address] and Moderate malnutrition (HCC) on their problem list.    ONSET DATE: 2021    CHART REVIEW:  2021 admitted with respiratory failure: ADMISSION H&P HPI: The patient is a 76 y.o. female with hx depression, dementia, HTN, and anemia who presents to [facility] with respiratory failure. Patient is currently intubated and sedated and unable to provide any history. According to the notes, patient was having shortness of breath with congestion for the past two days at [facility]. She was saturating 76% on 5L at the nursing home and only responded up to 84% on a nonrebreather. She arrived in the ED in respiratory distress and the ED physician was concerned for aspiration. The daughter was there and stated she was a Limited Code but ok with intubation and so she was intubated in the ED for respiratory failure. In the ED, labs were significant for a sodium of 130, chloride of 93, biacrb of 19, WBC count of 13.4K. VBG showed a pH of 7.391 and pCO2 of 36.8. CXR showed bronchial thickening and interstitial airspace disease in the left infrahilar region and right lung base laterally. COVID is pending at the time of admission. 2021 intubated but extubated same day    2021 COVID-19 not detected    2021 MBS ordered. Palliative Care notes:  Pt was enrolled in Hospice of [address] [place] [address] discharged on 21 per family request to seek more aggressive care. Daughter Mel Donaldson wants MBS and PEG if needed then DC home with her and Hospice support.        DYSPHAGIA HISTORY:  EMR review limited  Per 2021 SLP note: This SLP did discuss with RN who states family has reported problems with swallowing and aspiration with reported recent test. Pt is currently in covid 19 r/o precautions. This SLP reviewed chart and did discuss with pt's daughter. Chart review did confirm Esophagram 97/27/2021) and recent EGD 7/28/2021; 7/30/2021; 8/2/2021. Dtr reporting first EGD aborted due to difficulty passing scope. She reported 2nd and 3rd went a little better per GI report but persistent problems at meals. Dtr reports diet has progressively been downgraded from regular to mechanical soft to puree due to problems. Dtr reporting while pt was at [facility] discussions of Hospice and PEG tube. Date of Eval: 8/20/2021  Evaluating Therapist: ABHINAV Villar    Current Diet level:  Current Diet : NPO (Puree at [facility] prior to admission)  Current Liquid Diet : NPO ([place] at [facility] prior to admission)      Primary Complaint  Patient Complaint: patient unable generate complaint; chart review indicates family report for significant esophageal dysaphia with request for assessment of oropharyngeal dysphagia with plan to place PEG if deemed indicated    Pain:  Pain Level: 0    Reason for Referral  Hermann Russell was referred for a bedside swallow evaluation to assess the efficiency of her swallow function, identify signs and symptoms of aspiration and make recommendations regarding safe dietary consistencies, effective compensatory strategies, and safe eating environment. Impression  Dysphagia Diagnosis: Suspected needs further assessment  · Accepted and tolerated evaluation at bedside.     · Patient alert and cooperative, but confused, nonverbal, and unable to follow dx for swallow eval.   · Clinically, patient appears to present with severe/marked oral stage dysphagia characterized by prolonged oral holding and decreased lingual manipulation for bolus control and movement with concern for premature bolus loss to the pharynx and potential pharyngeal dysphagia characterized by delayed swallow and decreased laryngeal elevation. Patient with known esophogeal involvements per family report with concern for limited esophageal passage. Delayed cough with PO trials administer (thin via teaspoon and nectar via teaspoon d/t concern for decreased esophageal tolerance of solids) may be indicative of potential penetration/aspiration and/or symptom of esophageal involvement. · Recommend to proceed with MBS as ordered for continued assessment, but suspect MBS may be limited by severity of patient's oral phase impairments. · Additional suspicion, patient unlikely able to maintain adequate level of nutrition d/t severity of oral stage dysphagia regardless of potential pharyngeal and/or esophageal dysphagias. Dysphagia Outcome Severity Scale: Level 2: Moderate Severe dysphagia- Maximum assistance or maximum use of strategies with partial PO only     Treatment Plan  Requires SLP Intervention: Yes  Duration/Frequency of Treatment: pending MBS  D/C Recommendations: To be determined    Recommended Diet and Intervention  Diet Solids Recommendation:  (pending MBS)  Liquid Consistency Recommendation:  (pending MBS)  Recommended Form of Meds:  (pending MBS)     Therapeutic Interventions:  (pending MBS)    Compensatory Swallowing Strategies  Compensatory Swallowing Strategies:  (pending MBS)    Treatment/Goals  Dysphagia Goals: The patient will tolerate instrumental swallowing procedure    General  Chart Reviewed: Yes  Behavior/Cognition: Cooperative;Pleasant mood; Lethargic;Requires cueing  Communication Observation: Non-verbal  Follows Directions: None  Patient Positioning: Upright in bed  Baseline Vocal Quality:  (AYALA)  Volitional Cough:  (AYALA)  Volitional Swallow:  (AYALA)  Consistencies Administered:  Thin - teaspoon;Nectar - teaspoon   *PO trials limited to liquids d/t concern for severity of described esophogeal dysphagia     Vision/Hearing  Vision:  (AYALA)  Hearing: (AYALA)    Oral Motor Deficits  Oral Motor:  (does not follow dx for formal exam - noted with limited labial strength for lip rounding; unable to visualize oral cavity for additional oral motor assessment)    Oral Phase Dysfunction  Impaired Mastication:  (AYALA)  Decreased Anterior to Posterior Transit: All  Suspected Premature Bolus Loss: All  Oral Phase - Comment: prolonged oral holding with all trials with repeated cueing: suspected limited ability to maintain adequate level of nutrition d/t severity of oral phase impairments regardless of pharyngeal and/or esophageal involvements impact     Indicators of Pharyngeal Phase Dysfunction  Delayed Swallow: All  Decreased Laryngeal Elevation: All  Pharyngeal: delayed cough with liquids may be 2/2 esophageal dysphagia vs potential penetration/aspiration symptom - rec proceed with ordered MBS for continued assessment if deemed indicated by MD.    Prognosis  Prognosis for safe diet advancement: fair  Barriers to reach goals: cognitive deficits;severity of dysphagia  Consulted and agree with results and recommendations: Patient;RN;MD    Education  Patient Education: attempted on results/recs/plan  Patient Education Response: No evidence of learning         Therapy Time  SLP Individual Minutes  Time In: 1050  Time Out: [address]  Minutes: [address] [facility].  Bharat Sow, #[id number]  Speech-Language Pathologist  Portable phone: [phone]  8/20/2021 11:15 AM

## 2021-08-20 NOTE — PROGRESS NOTES
Physician Progress Note      PATIENT:               Meryle Dyers  CSN #:                  [id number]  :                       1945  ADMIT DATE:       2021 1:45 AM  DISCH DATE:  RESPONDING  PROVIDER #:        Lou Kaye MD          QUERY TEXT:    Pt admitted with acute respiratory failure, aspiration pneumonia, and sepsis. BMI 17.0 and per dietary consult on , meets criteria for moderate   malnutrition. If possible, please document in the progress notes and discharge   summary:    The medical record reflects the following:  Risk Factors: significant weight loss over the past 6 months, dementia  Clinical Indicators: BMI 17.0. Per dietician, meets criteria for severe   malnutrition based on the following:  Weight Loss:  Greater than 10% over 6 months  Body Fat Loss: (moderate) Orbital, Buccal region  Muscle Mass Loss: (moderate) Temples (temporalis)  Treatment: dietary consult, SLP to eval swallowing    Thank you,  Rigoberto Cortez RN, BSN, CCDS  [email or web address]  Options provided:  -- Moderate malnutrition present on admission  -- Other - I will add my own diagnosis  -- Disagree - Not applicable / Not valid  -- Disagree - Clinically unable to determine / Unknown  -- Refer to Clinical Documentation Reviewer    PROVIDER RESPONSE TEXT:    This patient has moderate malnutrition present on admission.     Query created by: Nakul Hubbard on 2021 9:29 AM      Electronically signed by:  Lou Kaye MD 2021 4:03 PM

## 2021-08-21 LAB
ANION GAP SERPL CALCULATED.3IONS-SCNC: 16 MMOL/L (ref 3–16)
BASOPHILS ABSOLUTE: 0.1 K/UL (ref 0–0.2)
BASOPHILS RELATIVE PERCENT: 0.6 %
BUN BLDV-MCNC: 14 MG/DL (ref 7–20)
CALCIUM SERPL-MCNC: 8.6 MG/DL (ref 8.3–10.6)
CHLORIDE BLD-SCNC: 93 MMOL/L (ref 99–110)
CO2: 25 MMOL/L (ref 21–32)
CREAT SERPL-MCNC: 0.7 MG/DL (ref 0.6–1.2)
EOSINOPHILS ABSOLUTE: 0 K/UL (ref 0–0.6)
EOSINOPHILS RELATIVE PERCENT: 0.3 %
GFR AFRICAN AMERICAN: >60
GFR NON-AFRICAN AMERICAN: >60
GLUCOSE BLD-MCNC: 169 MG/DL (ref 70–99)
GLUCOSE BLD-MCNC: 67 MG/DL (ref 70–99)
GLUCOSE BLD-MCNC: 73 MG/DL (ref 70–99)
GLUCOSE BLD-MCNC: 80 MG/DL (ref 70–99)
GLUCOSE BLD-MCNC: 83 MG/DL (ref 70–99)
GLUCOSE BLD-MCNC: 96 MG/DL (ref 70–99)
GLUCOSE BLD-MCNC: 97 MG/DL (ref 70–99)
HCT VFR BLD CALC: 24.4 % (ref 36–48)
HEMOGLOBIN: 8.3 G/DL (ref 12–16)
LYMPHOCYTES ABSOLUTE: 0.5 K/UL (ref 1–5.1)
LYMPHOCYTES RELATIVE PERCENT: 5.5 %
MAGNESIUM: 1.9 MG/DL (ref 1.8–2.4)
MCH RBC QN AUTO: 29.1 PG (ref 26–34)
MCHC RBC AUTO-ENTMCNC: 33.9 G/DL (ref 31–36)
MCV RBC AUTO: 86 FL (ref 80–100)
MONOCYTES ABSOLUTE: 1.1 K/UL (ref 0–1.3)
MONOCYTES RELATIVE PERCENT: 11.5 %
NEUTROPHILS ABSOLUTE: 7.6 K/UL (ref 1.7–7.7)
NEUTROPHILS RELATIVE PERCENT: 82.1 %
PDW BLD-RTO: 16.4 % (ref 12.4–15.4)
PERFORMED ON: ABNORMAL
PERFORMED ON: ABNORMAL
PERFORMED ON: NORMAL
PHOSPHORUS: 3 MG/DL (ref 2.5–4.9)
PLATELET # BLD: 314 K/UL (ref 135–450)
PMV BLD AUTO: 7.6 FL (ref 5–10.5)
POTASSIUM REFLEX MAGNESIUM: 3.5 MMOL/L (ref 3.5–5.1)
RBC # BLD: 2.84 M/UL (ref 4–5.2)
SODIUM BLD-SCNC: 134 MMOL/L (ref 136–145)
WBC # BLD: 9.3 K/UL (ref 4–11)

## 2021-08-21 PROCEDURE — C9113 INJ PANTOPRAZOLE SODIUM, VIA: HCPCS | Performed by: PHYSICIAN ASSISTANT

## 2021-08-21 PROCEDURE — 97530 THERAPEUTIC ACTIVITIES: CPT

## 2021-08-21 PROCEDURE — 1200000000 HC SEMI PRIVATE

## 2021-08-21 PROCEDURE — 85025 COMPLETE CBC W/AUTO DIFF WBC: CPT

## 2021-08-21 PROCEDURE — 6360000002 HC RX W HCPCS: Performed by: PHYSICIAN ASSISTANT

## 2021-08-21 PROCEDURE — 2580000003 HC RX 258: Performed by: INTERNAL MEDICINE

## 2021-08-21 PROCEDURE — 94760 N-INVAS EAR/PLS OXIMETRY 1: CPT

## 2021-08-21 PROCEDURE — 83735 ASSAY OF MAGNESIUM: CPT

## 2021-08-21 PROCEDURE — 2500000003 HC RX 250 WO HCPCS: Performed by: NURSE PRACTITIONER

## 2021-08-21 PROCEDURE — 97162 PT EVAL MOD COMPLEX 30 MIN: CPT

## 2021-08-21 PROCEDURE — 6360000002 HC RX W HCPCS: Performed by: INTERNAL MEDICINE

## 2021-08-21 PROCEDURE — 2580000003 HC RX 258: Performed by: PHYSICIAN ASSISTANT

## 2021-08-21 PROCEDURE — 84100 ASSAY OF PHOSPHORUS: CPT

## 2021-08-21 PROCEDURE — 80048 BASIC METABOLIC PNL TOTAL CA: CPT

## 2021-08-21 PROCEDURE — 6370000000 HC RX 637 (ALT 250 FOR IP): Performed by: INTERNAL MEDICINE

## 2021-08-21 PROCEDURE — 97166 OT EVAL MOD COMPLEX 45 MIN: CPT

## 2021-08-21 PROCEDURE — 6370000000 HC RX 637 (ALT 250 FOR IP): Performed by: NURSE PRACTITIONER

## 2021-08-21 RX ORDER — LABETALOL HYDROCHLORIDE 5 MG/ML
10 INJECTION, SOLUTION INTRAVENOUS EVERY 6 HOURS PRN
Status: DISCONTINUED | OUTPATIENT
Start: 2021-08-21 | End: 2021-08-27 | Stop reason: HOSPADM

## 2021-08-21 RX ORDER — LORAZEPAM 2 MG/ML
1 INJECTION INTRAMUSCULAR ONCE
Status: COMPLETED | OUTPATIENT
Start: 2021-08-21 | End: 2021-08-21

## 2021-08-21 RX ADMIN — Medication 10 ML: at 10:31

## 2021-08-21 RX ADMIN — Medication 10 ML: at 10:30

## 2021-08-21 RX ADMIN — DEXTROSE MONOHYDRATE 12.5 G: 25 INJECTION, SOLUTION INTRAVENOUS at 20:28

## 2021-08-21 RX ADMIN — MUPIROCIN: 20 OINTMENT TOPICAL at 10:30

## 2021-08-21 RX ADMIN — PANTOPRAZOLE SODIUM 40 MG: 40 INJECTION, POWDER, FOR SOLUTION INTRAVENOUS at 20:28

## 2021-08-21 RX ADMIN — CEFEPIME HYDROCHLORIDE 2000 MG: 2 INJECTION, POWDER, FOR SOLUTION INTRAVENOUS at 04:20

## 2021-08-21 RX ADMIN — SODIUM CHLORIDE, PRESERVATIVE FREE 10 ML: 5 INJECTION INTRAVENOUS at 10:33

## 2021-08-21 RX ADMIN — DEXTROSE MONOHYDRATE 12.5 G: 25 INJECTION, SOLUTION INTRAVENOUS at 02:18

## 2021-08-21 RX ADMIN — LORAZEPAM 1 MG: 2 INJECTION INTRAMUSCULAR; INTRAVENOUS at 17:02

## 2021-08-21 RX ADMIN — Medication 10 ML: at 20:28

## 2021-08-21 RX ADMIN — CEFEPIME HYDROCHLORIDE 2000 MG: 2 INJECTION, POWDER, FOR SOLUTION INTRAVENOUS at 17:02

## 2021-08-21 ASSESSMENT — PAIN SCALES - PAIN ASSESSMENT IN ADVANCED DEMENTIA (PAINAD)
BODYLANGUAGE: 0
NEGVOCALIZATION: 0
CONSOLABILITY: 0
NEGVOCALIZATION: 0
BREATHING: 0
BODYLANGUAGE: 0
TOTALSCORE: 0
FACIALEXPRESSION: 0
BREATHING: 0
NEGVOCALIZATION: 0
BODYLANGUAGE: 0
CONSOLABILITY: 0
CONSOLABILITY: 0
BODYLANGUAGE: 0
BREATHING: 0
FACIALEXPRESSION: 0
TOTALSCORE: 0
CONSOLABILITY: 0
TOTALSCORE: 0
BREATHING: 0
NEGVOCALIZATION: 0
TOTALSCORE: 0

## 2021-08-21 ASSESSMENT — PAIN SCALES - GENERAL: PAINLEVEL_OUTOF10: 0

## 2021-08-21 NOTE — PROGRESS NOTES
Physical Therapy    Facility/Department: [facility] ORTHOPEDICS  Initial Assessment    NAME: Ancelmo Fiore  :   MRN: [id number]    Date of Service: 2021    Discharge Recommendations:  Continue to assess pending progress   PT Equipment Recommendations  Other: Will monitor for any equipt needs. Assessment   Body structures, Functions, Activity limitations: Decreased functional mobility ; Decreased safe awareness;Decreased cognition  Assessment: 75 y/o female admit 2021 with Acute Respiratory, Septicemia. Pt intubated briefly 2021. PMH as noted including HTN, Dementia. Pt unable to provide any PLOF this date. Per CM note, pt is from home w/ her dtr who provides  care. Dtr hopeful to enroll pt in hospice services in the future. At this time, dependent with eob/oob via Stedy. Limited due to extent of dementia. Will monitor pt's progress and ability to participate. Unsure cont PT Services will be warranted upon d/c. Prognosis: Guarded  Decision Making: Medium Complexity  History: 75 y/o female admit 2021 with Acute Respiratory, Septicemia. Pt intubated briefly 2021. PMH as noted including HTN, Dementia. Exam: See above. Clinical Presentation: See above. Patient Education: Role of PT, POC, Need to call for assist.  Barriers to Learning: Cognitive. REQUIRES PT FOLLOW UP: Yes  Activity Tolerance  Activity Tolerance: Patient limited by cognitive status  Activity Tolerance: Pt did not attempt to engage in eob/oob activities. Patient Diagnosis(es): The primary encounter diagnosis was Acute respiratory failure with hypoxia ([address] A diagnosis of Septicemia ([address] was also pertinent to this visit. has a past medical history of Anemia, Catatonic state, Dementia ([address] Depression, and Hypertension. has no past surgical history on file.     Restrictions  Restrictions/Precautions  Restrictions/Precautions: Fall Risk  Position Activity Restriction  Other position/activity restrictions: Diet : Dysphagia : Honey Thick, Pureed. Vision/Hearing  Vision:  (Pt inconsist tracks/turns head to sounds.)  Hearing:  (Pt inconsist tracks/turns head to sounds.)     Subjective  General  Chart Reviewed: Yes  Patient assessed for rehabilitation services?: Yes  Additional Pertinent Hx: 77 y/o female admit 8/19/2021 with Acute Respiratory, Septicemia. Pt intubated briefly 8/19/2021. PMH as noted including HTN, Dementia. Family / Caregiver Present: No  Referring Practitioner: Dr Fermín Emerson  Diagnosis: Acute Respiratory, Septicemia  Other (Comment): Pt unable to follow any simple commands. Subjective  Subjective: Pt nonverbal, did not attempt to speak. Orientation  Orientation  Overall Orientation Status: Impaired  Orientation Level: Unable to assess  Social/Functional History  Social/Functional History  Lives With: Daughter  Type of Home: House  Home Layout: One level  Home Access: Stairs to enter with rails  Entrance Stairs - Number of Steps: 1 BENITA  ADL Assistance: Needs assistance  Homemaking Assistance:  (Homemaking needs taken care of by family.)  Ambulation Assistance:  (Unsure if pt amb pta.)  Transfer Assistance: Needs assistance  Additional Comments: Pt unable to provide any PLOF this date. Per CM note, pt is from home w/ her dtr who provides 24/7 care. Dtr hopeful to enroll pt in hospice services in the future. Cognition   Cognition  Overall Cognitive Status: Exceptions  Arousal/Alertness: Unresponsive to stimuli;Inconsistent responses to stimuli (inconsistently localizes/tracks to voice)  Following Commands: Does not follow commands  Attention Span: Unable to maintain attention  Initiation: Requires cues for all  Sequencing: Requires cues for all    Objective             Strength Other  Other: Unable to assess ROM/Strength due to pt's cognitive status. Appears adequate for oob via Stedy.         Bed mobility  Supine to Sit: Dependent/Total  Transfers  Sit to Stand: Maximum Assistance ([address]  Stand to sit: Maximum Assistance ([address]  Bed to Chair: Dependent/Total ([address]  Comment: Dependent place hands on Crossbar of Stedy although pt able to maintain grasp. Balance  Comments: EOB : CGA. Static sitting during use of Stedy : Min assist.        Plan   Plan  Times per week: 1-2 further PT Rxs. Current Treatment Recommendations: Functional Mobility Training, Transfer Training, Gait Training, Safety Education & Training, Patient/Caregiver Education & Training  Safety Devices  Type of devices: Call light within reach, Chair alarm in place, Left in chair, Nurse notified      AM-PAC Score  AM-PAC Inpatient Mobility Raw Score : 6 (08/21/21 1001)  AM-PAC Inpatient T-Scale Score : 23.55 (08/21/21 1001)  Mobility Inpatient CMS 0-100% Score: 100 (08/21/21 1001)  Mobility Inpatient CMS G-Code Modifier : CN (08/21/21 1001)          Goals  Short term goals  Time Frame for Short term goals: Upon d/c acute care. Short term goal 1: Bed Mob assist x 1  Short term goal 2: Transfer via pivot or stedy Mod assist x 1. Short term goal 3: Attempt Stand/Amb with/without assist device as approp. Patient Goals   Patient goals : None stated.        Therapy Time   Individual Concurrent Group Co-treatment   Time In 0830         Time Out 0900         Minutes [address], PT

## 2021-08-21 NOTE — PROGRESS NOTES
INPATIENT CONSULTATION:    IDENTIFYING DATA/REASON FOR CONSULTATION   PATIENT:  Ysabel Mercado  MRN:  [id number]  ADMIT DATE: 8/19/2021  TIME OF EVALUATION: 8/21/2021 7:10 AM  HOSPITAL STAY:   LOS: 2 days   CONSULTING PHYSICIAN: El Whittington MD   REASON FOR CONSULTATION: Dysphagia    Subjective:    Patient seen and examined in follow-up. Patient does not provide any history and does not interact. Per nursing notes, patient refused medications and oral intake overnight. MEDICATIONS   SCHEDULED:  insulin lispro, 0-3 Units, Q4H  pantoprazole, 40 mg, BID   And  sodium chloride (PF), 10 mL, BID  sodium chloride flush, 10 mL, 2 times per day  lactobacillus, 2 capsule, BID WC  mupirocin, , BID  cefepime, 2,000 mg, Q12H  ARIPiprazole, 5 mg, BID  FLUoxetine, 20 mg, BID  mirtazapine, 7.5 mg, Nightly  heparin (porcine), 5,000 Units, BID  valsartan, 160 mg, Daily      FLUIDS/DRIPS:     sodium chloride      dextrose       PRNs: sodium chloride flush, 10 mL, PRN  sodium chloride, 25 mL, PRN  ondansetron, 4 mg, Q4H PRN  polyethylene glycol, 17 g, Daily PRN  acetaminophen, 650 mg, Q4H PRN   Or  acetaminophen, 650 mg, Q4H PRN  glucose, 15 g, PRN  dextrose, 12.5 g, PRN  glucagon (rDNA), 1 mg, PRN  dextrose, 100 mL/hr, PRN      ALLERGIES:    Allergies   Allergen Reactions    Buspar [Buspirone]     Ciprofloxacin     Food      STRAWBERRIES     Januvia [Sitagliptin]          PHYSICAL EXAM     Vitals:    08/21/21 0031 08/21/21 0031 08/21/21 0410 08/21/21 0652   BP:  (!) 152/72 134/65    Pulse: 97  99    Resp: 14  15    Temp: 98.9 °F (37.2 °C)  100.4 °F (38 °C)    TempSrc: Oral  Axillary    SpO2: 97%  96%    Weight:    83 lb 1.8 oz (37.7 kg)   Height:           I/O last 3 completed shifts:  In: -   Out: 220 [Urine:220]    Physical Exam:  Gen: Resting in bed, NAD   HEENT: normocephalic, atraumatic. No scleral icterus.    CV: RRR no MRG   Pul: CTAB, normal WOB   Abd: Good bowel sounds throughout, soft, NT/ND, no masses, no HSM Ext: No edema, atraumatic   Neuro: No gross cranial nerve deficits, patient does not participate in exam  Skin: No jaundice, spider angiomas, tello erythema     LABS AND IMAGING     Recent Results (from the past 24 hour(s))   POCT Glucose    Collection Time: 08/20/21  7:50 AM   Result Value Ref Range    POC Glucose 96 70 - 99 mg/dl    Performed on ACCU-CHEK    POCT Glucose    Collection Time: 08/20/21  3:11 PM   Result Value Ref Range    POC Glucose 98 70 - 99 mg/dl    Performed on ACCU-CHEK    Iron and TIBC    Collection Time: 08/20/21  3:20 PM   Result Value Ref Range    Iron 18 (L) 37 - 145 ug/dL    TIBC 155 (L) 260 - 445 ug/dL    Iron Saturation 12 (L) 15 - 50 %   Ferritin    Collection Time: 08/20/21  3:20 PM   Result Value Ref Range    Ferritin 701.3 (H) 15.0 - 150.0 ng/mL   Vitamin B12 & Folate    Collection Time: 08/20/21  3:20 PM   Result Value Ref Range    Vitamin B-12 >2000 (H) 211 - 911 pg/mL    Folate >20.00 4.78 - 24.20 ng/mL   POCT Glucose    Collection Time: 08/20/21  9:07 PM   Result Value Ref Range    POC Glucose 85 70 - 99 mg/dl    Performed on ACCU-CHEK    POCT Glucose    Collection Time: 08/21/21  2:10 AM   Result Value Ref Range    POC Glucose 67 (L) 70 - 99 mg/dl    Performed on ACCU-CHEK    POCT Glucose    Collection Time: 08/21/21  2:43 AM   Result Value Ref Range    POC Glucose 169 (H) 70 - 99 mg/dl    Performed on ACCU-CHEK    POCT Glucose    Collection Time: 08/21/21  6:08 AM   Result Value Ref Range    POC Glucose 96 70 - 99 mg/dl    Performed on ACCU-CHEK    Basic Metabolic Panel w/ Reflex to MG    Collection Time: 08/21/21  6:12 AM   Result Value Ref Range    Sodium 134 (L) 136 - 145 mmol/L    Potassium reflex Magnesium 3.5 3.5 - 5.1 mmol/L    Chloride 93 (L) 99 - 110 mmol/L    CO2 25 21 - 32 mmol/L    Anion Gap 16 3 - 16    Glucose 97 70 - 99 mg/dL    BUN 14 7 - 20 mg/dL    CREATININE 0.7 0.6 - 1.2 mg/dL    GFR Non-African American >60 >60    GFR African American >60 >60    Calcium 8.6 8.3 - 10.6 mg/dL   CBC auto differential    Collection Time: 08/21/21  6:12 AM   Result Value Ref Range    WBC 9.3 4.0 - 11.0 K/uL    RBC 2.84 (L) 4.00 - 5.20 M/uL    Hemoglobin 8.3 (L) 12.0 - 16.0 g/dL    Hematocrit 24.4 (L) 36.0 - 48.0 %    MCV 86.0 80.0 - 100.0 fL    MCH 29.1 26.0 - 34.0 pg    MCHC 33.9 31.0 - 36.0 g/dL    RDW 16.4 (H) 12.4 - 15.4 %    Platelets 500 322 - 808 K/uL    MPV 7.6 5.0 - 10.5 fL    Neutrophils % 82.1 %    Lymphocytes % 5.5 %    Monocytes % 11.5 %    Eosinophils % 0.3 %    Basophils % 0.6 %    Neutrophils Absolute 7.6 1.7 - 7.7 K/uL    Lymphocytes Absolute 0.5 (L) 1.0 - 5.1 K/uL    Monocytes Absolute 1.1 0.0 - 1.3 K/uL    Eosinophils Absolute 0.0 0.0 - 0.6 K/uL    Basophils Absolute 0.1 0.0 - 0.2 K/uL   Magnesium    Collection Time: 08/21/21  6:12 AM   Result Value Ref Range    Magnesium 1.90 1.80 - 2.40 mg/dL   Phosphorus    Collection Time: 08/21/21  6:12 AM   Result Value Ref Range    Phosphorus 3.0 2.5 - 4.9 mg/dL     Other Labs    Imaging    ASSESSMENT AND RECOMMENDATIONS   Tonio Mirza is a 60-year-old female with past medical history of dementia, hypertension, esophageal stricture, and GERD with history of Nissen fundoplication who presented to [facility] 8/19/2021 with dyspnea and hypoxia.     1. Dysphagia: Recent speech evaluation recommended patient is high risk for aspiration due to oropharyngeal dysphagia, and recommended consideration of honey thickened liquids for pleasure eating versus alternative means for nutritional access. I had a lengthy discussion with the patient's daughter regarding PEG tube placement, continued risk for aspiration despite PEG. The daughter reports the patient is asking to eat, and she would like her to have any nutrition that she wants. I explained that pleasure eating with a recommended speech therapy diet is a reasonable consideration, especially given recent hospice level care.   After lengthy discussion, the daughter would like to resume oral intake over the weekend with puréed thickened liquids and monitor intake and safety. Further consideration for PEG tube placement can occur next week if still interested. 2. Dementia: Continue current therapy. 3. Failure to thrive: Agree with remeron for possible appetite stimulation. Recommend   4. Severe protein malnutrition: Agree with remeron for possible appetite stimulation. Recommend   5. Aspiration pneumonia: On Vanc and Cefepime. Defer antibiotic therapy to primary team    RECOMMENDATIONS:    - Honey thickened liquids and solid food intake trial over the weekend. - Can consider PEG next week, although aspiration risk remains with PEG. Patient was previously under Hospice care which is appropriate given her advanced dementia. If you have any questions or need any further information, please feel free to contact anyone on our consult team.  Thank you for allowing us to participate in the care of Leann Marcum. Kimber Krause.  [address]

## 2021-08-21 NOTE — PROGRESS NOTES
Patient repositioned, one hour past ativan (1 mg) administered patient interactive, verbalizing needs, and cooperative. Cano intact, appropriate output, family updated and no concerns at this time.

## 2021-08-21 NOTE — PROGRESS NOTES
Occupational Therapy   Occupational Therapy Initial Assessment  Date: 2021   Patient Name: New Noyola  MRN: [id number]     : 1945    Date of Service: 2021    Discharge Recommendations:  Continue to assess pending progress, 24 hour supervision or assist  OT Equipment Recommendations  Equipment Needed: Yes  Mobility Devices: Hospital Bed;Transport Devices  Transport Devices: Patient Mechanical Lift  Other: may benefit from hosp. bed, moriah lift to decrease burden of care  New Noyola scored a  on the -[facility] ADL Inpatient form. Assessment   Performance deficits / Impairments: Decreased cognition;Decreased ADL status; Decreased functional mobility ; Decreased balance  Assessment: Pt is a 77 yo F admitted w/ aspiration pneumonia and intubated in ED and extubated same day. PMHx: depression, dementia, HTN, anemia. PTA, pt lives at home w/ her dtr who provides  care. This date, pt limited by decreased cognition and did not show any active participation in therapy tx. She required total A for bed mob, max Ax2 for sit<> florida stedy, but was able to sit EOB and on seat of florida stedy w/ CGA. Pt required total A for grooming task. Will continue to see on acute to trial therapy services to assess if pt is able to participate in therapy. Anticipate return home w/ dtr w/  care as prior, may benefit from increased aide services in the home to assist in care. May also benefit from hospital bed, moriah lift to decrease burden of care.   Prognosis: Guarded  Decision Making: Medium Complexity  History: see above  Exam: bed mob, fxl transfers, ADLs  Assistance / Modification: florida stedy, max/total Ax2  OT Education: OT Role;Orientation;Transfer Training  Barriers to Learning: cognition  REQUIRES OT FOLLOW UP: Yes  Activity Tolerance  Activity Tolerance: Treatment limited secondary to decreased cognition  Activity Tolerance: pt unable to actively participate in tx this date  Safety Devices  Safety Devices in place: Yes  Type of devices: Left in chair;Call light within reach;Gait belt;Nurse notified; Chair alarm in place           Patient Diagnosis(es): The primary encounter diagnosis was Acute respiratory failure with hypoxia ([facility] [address] A diagnosis of Septicemia ([facility] [address] was also pertinent to this visit. has a past medical history of Anemia, Catatonic state, Dementia ([address] Depression, and Hypertension. has no past surgical history on file. Restrictions  Restrictions/Precautions  Restrictions/Precautions: Fall Risk  Position Activity Restriction  Other position/activity restrictions: Diet : Dysphagia : Honey Thick, Pureed. Subjective   General  Chart Reviewed: Yes  Patient assessed for rehabilitation services?: Yes  Additional Pertinent Hx: per H&P: \"The patient is a 76 y.o. female with hx depression, dementia, HTN, and anemia who presents to [facility] with respiratory failure. Patient is currently intubated and sedated and unable to provide any history. According to the notes, patient was having shortness of breath with congestion for the past two days at [facility]. She was saturating 76% on 5L at the nursing home and only responded up to 84% on a nonrebreather. She arrived in the ED in respiratory distress and the ED physician was concerned for aspiration. The daughter was there and stated she was a Limited Code but ok with intubation and so she was intubated in the ED for respiratory failure. In the ED, labs were significant for a sodium of 130, chloride of 93, biacrb of 19, WBC count of 13.4K. VBG showed a pH of 7.391 and pCO2 of 36.8. CXR showed bronchial thickening and interstitial airspace disease in the left infrahilar region and right lung base laterally. COVID is pending at the time of admission. \"  Family / Caregiver Present: No  Referring Practitioner: Geovanni Luis  Diagnosis: acute aspiration pneumonia  Subjective  Subjective: Pt met b/s for OT eval/tx w/ PT.  Pt in bed, does not verbalize or react to therapists but does not object to therapy. Pt w/ blank stare throughout session. Does not appear to be in pain  Vital Signs  Temp: 98.2 °F (36.8 °C)  Temp Source: Axillary  Pulse: 92  Heart Rate Source: Monitor  Resp: 16  BP: (!) 145/73  BP Location: Left Arm  MAP (mmHg): 97  Oxygen Therapy  SpO2: 97 %  O2 Device: None (Room air)  Social/Functional History  Social/Functional History  Lives With: Daughter  Type of Home: House  Home Layout: One level  Home Access: Stairs to enter with rails  Entrance Stairs - Number of Steps: 1 BENITA  ADL Assistance: Needs assistance  Homemaking Assistance:  (Homemaking needs taken care of by family.)  Ambulation Assistance:  (Unsure if pt amb pta.)  Transfer Assistance: Needs assistance  Additional Comments: Pt unable to provide any PLOF this date. Per CM note, pt is from home w/ her dtr who provides 24/7 care. Dtr hopeful to enroll pt in hospice services in the future.        Objective        Orientation  Overall Orientation Status: Impaired (Pt unable to answer any orientation questions)     Balance  Sitting Balance: Contact guard assistance (EOB)  Standing Balance: Maximum assistance (max Ax2)  Functional Mobility  Functional - Mobility Device: Other (florida stedy)  Assist Level: Dependent/Total  Functional Mobility Comments: Pt required assist x2 w/ use of florida stedy lift for safe mobility from bed > chair  ADL  Grooming: Dependent/Total (to wash face w/ prepared cloth)  Toileting: Dependent/Total (sifuentes catheter)  Additional Comments: Anticipate pt would require dependent care at this time based on strength, endurance, and cognition this session           Transfers  Sit to stand: Maximum assistance;2 Person assistance  Stand to sit: 2 Person assistance;Maximum assistance  Transfer Comments: <> florida stedy; pt does not initiate     Cognition  Overall Cognitive Status: Exceptions  Arousal/Alertness: Unresponsive to stimuli;Inconsistent responses to

## 2021-08-21 NOTE — PROGRESS NOTES
Patient refusing meds, PO food, or any interactions with hospital staff. Monitoring vitals and pain. No concerns at this time. Cano intact. Access patent. Continuing to monitor at this time.

## 2021-08-21 NOTE — PROGRESS NOTES
Pt resting in bed. Pt not interactive with staff, refusing medications and any PO intake overnight despite multiple attempts. Cano in place, 100 ml out overnight. Fall precautions in place, call light and bedside table within reach.

## 2021-08-21 NOTE — PROGRESS NOTES
Hospitalist Progress Note      PCP: Mercedez Chappell MD    Date of Admission: 8/19/2021    Chief Complaint: respiratory failure    Hospital Course: The patient is a 76 y.o. female with hx depression, dementia, HTN, and anemia who presents to [facility] with respiratory failure. Patient is currently intubated and sedated and unable to provide any history. According to the notes, patient was having shortness of breath with congestion for the past two days at [facility]. She was saturating 76% on 5L at the nursing home and only responded up to 84% on a nonrebreather. She arrived in the ED in respiratory distress and the ED physician was concerned for aspiration. The daughter was there and stated she was a Limited Code but ok with intubation and so she was intubated in the ED for respiratory failure.     In the ED, labs were significant for a sodium of 130, chloride of 93, biacrb of 19, WBC count of 13.4K. VBG showed a pH of 7.391 and pCO2 of 36.8. CXR showed bronchial thickening and interstitial airspace disease in the left infrahilar region and right lung base laterally. COVID is pending at the time of admission. Subjective: Pt seen and examined. She is nonverbal. Unable to obtain history. On room air.        Medications:  Reviewed    Infusion Medications    sodium chloride      dextrose       Scheduled Medications    insulin lispro  0-3 Units Subcutaneous Q4H    pantoprazole  40 mg Intravenous BID    And    sodium chloride (PF)  10 mL Intravenous BID    sodium chloride flush  10 mL Intravenous 2 times per day    lactobacillus  2 capsule Oral BID WC    mupirocin   Nasal BID    cefepime  2,000 mg Intravenous Q12H    ARIPiprazole  5 mg Oral BID    FLUoxetine  20 mg Oral BID    mirtazapine  7.5 mg Oral Nightly    heparin (porcine)  5,000 Units Subcutaneous BID    valsartan  160 mg Oral Daily     PRN Meds: sodium chloride flush, sodium chloride, ondansetron, polyethylene glycol, acetaminophen **OR** acetaminophen, glucose, dextrose, glucagon (rDNA), dextrose      Intake/Output Summary (Last 24 hours) at 8/21/2021 1128  Last data filed at 8/21/2021 3117  Gross per 24 hour   Intake 341.34 ml   Output 220 ml   Net 121.34 ml       Exam:    BP (!) 145/73   Pulse 92   Temp 98.2 °F (36.8 °C) (Axillary)   Resp 16   Ht 4' 10\" (1.473 m)   Wt 83 lb 1.8 oz (37.7 kg)   SpO2 97%   BMI 17.37 kg/m²     General appearance: Thin. Chronically ill appearing. No apparent distress, appears stated age and cooperative. HEENT: Pupils equal, round, and reactive to light. Conjunctivae/corneas clear. Neck: Supple, with full range of motion. No jugular venous distention. Trachea midline. Respiratory:  Normal respiratory effort. Clear to auscultation, bilaterally without Rales/Wheezes/Rhonchi. Cardiovascular: Regular rate and rhythm with normal S1/S2 without murmurs, rubs or gallops. Abdomen: Soft, non-tender, non-distended with normal bowel sounds. Musculoskeletal: No clubbing, cyanosis or edema bilaterally. Full range of motion without deformity. Skin: Skin color, texture, turgor normal.  No rashes or lesions. Neurologic:  Unable to cooperate with exam. Cranial nerves: II-XII intact, grossly non-focal.  Psychiatric: Nonverbal  Capillary Refill: Brisk,< 3 seconds   Peripheral Pulses: +2 palpable, equal bilaterally       Labs:   Recent Labs     08/19/21  0620 08/20/21  0435 08/21/21  0612   WBC 15.1* 8.7 9.3   HGB 10.2* 8.2* 8.3*   HCT 30.6* 23.8* 24.4*    296 314     Recent Labs     08/19/21  0735 08/20/21  0435 08/21/21  0612   * 137 134*   K 3.8 3.5 3.5   CL 93* 96* 93*   CO2 17* 28 25   BUN 19 14 14   CREATININE 0.8 0.9 0.7   CALCIUM 8.9 8.7 8.6   PHOS 3.5 3.5 3.0     No results for input(s): AST, ALT, BILIDIR, BILITOT, ALKPHOS in the last 72 hours. No results for input(s): INR in the last 72 hours.   Recent Labs     08/19/21  0208   TROPONINI <0.01       Urinalysis:    No results found for: Bynum Harada, BACTERIA, RBCUA, BLOODU, SPECGRAV, GLUCOSEU    Radiology:  FL MODIFIED BARIUM SWALLOW W VIDEO   Final Result   Penetration was seen. No definite aspiration      Please see separate speech pathology report for full discussion of findings   and recommendations. XR CHEST PORTABLE   Final Result   1. The endotracheal tube tip is 2-3 cm above the tariq. 2. The tip of the enteric tube is probably in a hiatal hernia as it remains   above the diaphragm and is partially coiled. XR CHEST PORTABLE   Final Result   COPD, with bronchial thickening and interstitial airspace disease in the left   infrahilar region and right lung base laterally suspicious for superimposed   pneumonitis.                  Assessment/Plan:    Active Hospital Problems    Diagnosis Date Noted    Acute aspiration pneumonia ([address] [J69.0] 08/19/2021    Moderate malnutrition ([address] [E44.0] 08/19/2021    Acute respiratory failure with hypoxia (HCC) [J96.01]        Shortness of breath likely 2/2 aspiration pneumonia  -given dose of Vanc and cefepime in the ED, continue with cefepime  -D-dimer minimally elevated     Sepsis 2/2 aspiration pneumonia  Meets at least 2 SIRS Criteria:  Tachypnea > 20  HR > 90  WBC > 12K  Source: lungs  -lactic acid q6h  -blood cultures x 2 drawn prior to administration of antibiotics  -IV antibiotics: cefepime  -IVF: 30 cc/kg given     Acute respiratory failure with hypoxia s/p mechanical intubation  -extubated 8/19, weaned to room air  -management as above  -wean oxygen to maintain SpO2 > 89%  -ventilator management per critical care     Anion gap metabolic acidosis  -bicarb gtt, stopped with improvement  -continue to trend and monitor    Oropharyngeal dysphagia  -family wants PEG tube placement  -GI consulted  -palliative care involved  -family wants hospice care at discharge     Depression  -continue home meds     Essential hypertension  -continue home meds  -IV Labetalol PRN for SBP > 180 as patient is not taking anything by mouth     Dementia  -continue home meds     Hypomagnesemia  -replace PRN       DVT Prophylaxis: heparin  Diet: ADULT DIET; Dysphagia - Pureed;  Moderately Thick (Honey)  Code Status: Limited    PT/OT Eval Status: ordered    Dispo - continue care, watch PO intake over the weekend and consider PEG on Monday    Melvi Fiore MD

## 2021-08-22 LAB
ANION GAP SERPL CALCULATED.3IONS-SCNC: 13 MMOL/L (ref 3–16)
BASOPHILS ABSOLUTE: 0.1 K/UL (ref 0–0.2)
BASOPHILS RELATIVE PERCENT: 0.6 %
BUN BLDV-MCNC: 19 MG/DL (ref 7–20)
CALCIUM SERPL-MCNC: 8.9 MG/DL (ref 8.3–10.6)
CHLORIDE BLD-SCNC: 98 MMOL/L (ref 99–110)
CO2: 25 MMOL/L (ref 21–32)
CREAT SERPL-MCNC: 0.8 MG/DL (ref 0.6–1.2)
EOSINOPHILS ABSOLUTE: 0.1 K/UL (ref 0–0.6)
EOSINOPHILS RELATIVE PERCENT: 0.6 %
GFR AFRICAN AMERICAN: >60
GFR NON-AFRICAN AMERICAN: >60
GLUCOSE BLD-MCNC: 177 MG/DL (ref 70–99)
GLUCOSE BLD-MCNC: 213 MG/DL (ref 70–99)
GLUCOSE BLD-MCNC: 221 MG/DL (ref 70–99)
GLUCOSE BLD-MCNC: 84 MG/DL (ref 70–99)
GLUCOSE BLD-MCNC: 85 MG/DL (ref 70–99)
GLUCOSE BLD-MCNC: 89 MG/DL (ref 70–99)
HCT VFR BLD CALC: 22.4 % (ref 36–48)
HEMOGLOBIN: 7.6 G/DL (ref 12–16)
LYMPHOCYTES ABSOLUTE: 0.5 K/UL (ref 1–5.1)
LYMPHOCYTES RELATIVE PERCENT: 5.8 %
MAGNESIUM: 1.9 MG/DL (ref 1.8–2.4)
MCH RBC QN AUTO: 29.4 PG (ref 26–34)
MCHC RBC AUTO-ENTMCNC: 34.1 G/DL (ref 31–36)
MCV RBC AUTO: 86.2 FL (ref 80–100)
MONOCYTES ABSOLUTE: 1.1 K/UL (ref 0–1.3)
MONOCYTES RELATIVE PERCENT: 12.2 %
NEUTROPHILS ABSOLUTE: 7.3 K/UL (ref 1.7–7.7)
NEUTROPHILS RELATIVE PERCENT: 80.8 %
PDW BLD-RTO: 16.3 % (ref 12.4–15.4)
PERFORMED ON: ABNORMAL
PERFORMED ON: NORMAL
PERFORMED ON: NORMAL
PHOSPHORUS: 3.2 MG/DL (ref 2.5–4.9)
PLATELET # BLD: 338 K/UL (ref 135–450)
PMV BLD AUTO: 7.5 FL (ref 5–10.5)
POTASSIUM REFLEX MAGNESIUM: 3.5 MMOL/L (ref 3.5–5.1)
RBC # BLD: 2.6 M/UL (ref 4–5.2)
SODIUM BLD-SCNC: 136 MMOL/L (ref 136–145)
WBC # BLD: 9 K/UL (ref 4–11)

## 2021-08-22 PROCEDURE — 2580000003 HC RX 258: Performed by: PHYSICIAN ASSISTANT

## 2021-08-22 PROCEDURE — 83735 ASSAY OF MAGNESIUM: CPT

## 2021-08-22 PROCEDURE — C9113 INJ PANTOPRAZOLE SODIUM, VIA: HCPCS | Performed by: PHYSICIAN ASSISTANT

## 2021-08-22 PROCEDURE — 6360000002 HC RX W HCPCS: Performed by: NURSE PRACTITIONER

## 2021-08-22 PROCEDURE — 36592 COLLECT BLOOD FROM PICC: CPT

## 2021-08-22 PROCEDURE — 84100 ASSAY OF PHOSPHORUS: CPT

## 2021-08-22 PROCEDURE — 1200000000 HC SEMI PRIVATE

## 2021-08-22 PROCEDURE — 6360000002 HC RX W HCPCS: Performed by: PHYSICIAN ASSISTANT

## 2021-08-22 PROCEDURE — 2580000003 HC RX 258: Performed by: INTERNAL MEDICINE

## 2021-08-22 PROCEDURE — 6360000002 HC RX W HCPCS: Performed by: INTERNAL MEDICINE

## 2021-08-22 PROCEDURE — 80048 BASIC METABOLIC PNL TOTAL CA: CPT

## 2021-08-22 PROCEDURE — 6370000000 HC RX 637 (ALT 250 FOR IP): Performed by: INTERNAL MEDICINE

## 2021-08-22 PROCEDURE — 85025 COMPLETE CBC W/AUTO DIFF WBC: CPT

## 2021-08-22 PROCEDURE — 6370000000 HC RX 637 (ALT 250 FOR IP): Performed by: NURSE PRACTITIONER

## 2021-08-22 RX ORDER — LORAZEPAM 2 MG/ML
1 INJECTION INTRAMUSCULAR EVERY 6 HOURS PRN
Status: DISCONTINUED | OUTPATIENT
Start: 2021-08-22 | End: 2021-08-27 | Stop reason: HOSPADM

## 2021-08-22 RX ORDER — MORPHINE SULFATE 2 MG/ML
2 INJECTION, SOLUTION INTRAMUSCULAR; INTRAVENOUS EVERY 4 HOURS PRN
Status: DISCONTINUED | OUTPATIENT
Start: 2021-08-22 | End: 2021-08-27 | Stop reason: HOSPADM

## 2021-08-22 RX ADMIN — HEPARIN SODIUM 5000 UNITS: 5000 INJECTION INTRAVENOUS; SUBCUTANEOUS at 09:48

## 2021-08-22 RX ADMIN — MUPIROCIN: 20 OINTMENT TOPICAL at 21:38

## 2021-08-22 RX ADMIN — CEFEPIME HYDROCHLORIDE 2000 MG: 2 INJECTION, POWDER, FOR SOLUTION INTRAVENOUS at 15:37

## 2021-08-22 RX ADMIN — CEFEPIME HYDROCHLORIDE 2000 MG: 2 INJECTION, POWDER, FOR SOLUTION INTRAVENOUS at 03:27

## 2021-08-22 RX ADMIN — Medication 20 MG: at 20:01

## 2021-08-22 RX ADMIN — HEPARIN SODIUM 5000 UNITS: 5000 INJECTION INTRAVENOUS; SUBCUTANEOUS at 21:30

## 2021-08-22 RX ADMIN — ACETAMINOPHEN 650 MG: 325 TABLET ORAL at 09:49

## 2021-08-22 RX ADMIN — PANTOPRAZOLE SODIUM 40 MG: 40 INJECTION, POWDER, FOR SOLUTION INTRAVENOUS at 09:47

## 2021-08-22 RX ADMIN — Medication 20 MG: at 09:49

## 2021-08-22 RX ADMIN — INSULIN LISPRO 1 UNITS: 100 INJECTION, SOLUTION INTRAVENOUS; SUBCUTANEOUS at 13:25

## 2021-08-22 RX ADMIN — ARIPIPRAZOLE 5 MG: 5 TABLET ORAL at 09:49

## 2021-08-22 RX ADMIN — LORAZEPAM 1 MG: 2 INJECTION INTRAMUSCULAR; INTRAVENOUS at 17:14

## 2021-08-22 RX ADMIN — INSULIN LISPRO 1 UNITS: 100 INJECTION, SOLUTION INTRAVENOUS; SUBCUTANEOUS at 21:31

## 2021-08-22 RX ADMIN — Medication 10 ML: at 09:30

## 2021-08-22 RX ADMIN — ARIPIPRAZOLE 5 MG: 5 TABLET ORAL at 20:01

## 2021-08-22 RX ADMIN — PANTOPRAZOLE SODIUM 40 MG: 40 INJECTION, POWDER, FOR SOLUTION INTRAVENOUS at 20:01

## 2021-08-22 RX ADMIN — SODIUM CHLORIDE, PRESERVATIVE FREE 10 ML: 5 INJECTION INTRAVENOUS at 20:06

## 2021-08-22 RX ADMIN — Medication 10 ML: at 20:05

## 2021-08-22 RX ADMIN — MIRTAZAPINE 7.5 MG: 15 TABLET, ORALLY DISINTEGRATING ORAL at 20:01

## 2021-08-22 RX ADMIN — INSULIN LISPRO 1 UNITS: 100 INJECTION, SOLUTION INTRAVENOUS; SUBCUTANEOUS at 18:58

## 2021-08-22 RX ADMIN — VALSARTAN 160 MG: 160 TABLET, FILM COATED ORAL at 09:49

## 2021-08-22 ASSESSMENT — PAIN SCALES - PAIN ASSESSMENT IN ADVANCED DEMENTIA (PAINAD)
CONSOLABILITY: 1
TOTALSCORE: 3
TOTALSCORE: 5
BODYLANGUAGE: 0
NEGVOCALIZATION: 0
BREATHING: 0
CONSOLABILITY: 0
BODYLANGUAGE: 1
TOTALSCORE: 0
FACIALEXPRESSION: 1
FACIALEXPRESSION: 2
BODYLANGUAGE: 1
NEGVOCALIZATION: 0
BREATHING: 0
FACIALEXPRESSION: 0
BREATHING: 0
CONSOLABILITY: 1
NEGVOCALIZATION: 1

## 2021-08-22 ASSESSMENT — PAIN SCALES - GENERAL
PAINLEVEL_OUTOF10: 5
PAINLEVEL_OUTOF10: 3
PAINLEVEL_OUTOF10: 0

## 2021-08-22 NOTE — PROGRESS NOTES
Hospitalist Progress Note      PCP: Chandan Ordaz MD    Date of Admission: 8/19/2021    Chief Complaint: respiratory failure    Hospital Course: The patient is a 76 y.o. female with hx depression, dementia, HTN, and anemia who presents to [facility] with respiratory failure. Patient is currently intubated and sedated and unable to provide any history. According to the notes, patient was having shortness of breath with congestion for the past two days at [facility]. She was saturating 76% on 5L at the nursing home and only responded up to 84% on a nonrebreather. She arrived in the ED in respiratory distress and the ED physician was concerned for aspiration. The daughter was there and stated she was a Limited Code but ok with intubation and so she was intubated in the ED for respiratory failure.     In the ED, labs were significant for a sodium of 130, chloride of 93, biacrb of 19, WBC count of 13.4K. VBG showed a pH of 7.391 and pCO2 of 36.8. CXR showed bronchial thickening and interstitial airspace disease in the left infrahilar region and right lung base laterally. COVID is pending at the time of admission. Subjective: Pt seen and examined. Awake, alert, minimally conversant. Has no complaints.        Medications:  Reviewed    Infusion Medications    sodium chloride      dextrose       Scheduled Medications    insulin lispro  0-3 Units Subcutaneous Q4H    pantoprazole  40 mg Intravenous BID    And    sodium chloride (PF)  10 mL Intravenous BID    sodium chloride flush  10 mL Intravenous 2 times per day    lactobacillus  2 capsule Oral BID WC    mupirocin   Nasal BID    cefepime  2,000 mg Intravenous Q12H    ARIPiprazole  5 mg Oral BID    FLUoxetine  20 mg Oral BID    mirtazapine  7.5 mg Oral Nightly    heparin (porcine)  5,000 Units Subcutaneous BID    valsartan  160 mg Oral Daily     PRN Meds: morphine, labetalol, sodium chloride flush, sodium chloride, ondansetron, polyethylene glycol, acetaminophen **OR** acetaminophen, glucose, dextrose, glucagon (rDNA), dextrose      Intake/Output Summary (Last 24 hours) at 8/22/2021 1231  Last data filed at 8/22/2021 0945  Gross per 24 hour   Intake 337.13 ml   Output 400 ml   Net -62.87 ml       Exam:    BP (!) 152/74   Pulse 91   Temp 98.5 °F (36.9 °C) (Oral)   Resp 16   Ht 4' 10\" (1.473 m)   Wt 82 lb 14.3 oz (37.6 kg)   SpO2 97%   BMI 17.32 kg/m²     General appearance: Thin. Chronically ill appearing. No apparent distress, appears stated age and cooperative. HEENT: Pupils equal, round, and reactive to light. Conjunctivae/corneas clear. Neck: Supple, with full range of motion. No jugular venous distention. Trachea midline. Respiratory:  Normal respiratory effort. Clear to auscultation, bilaterally without Rales/Wheezes/Rhonchi. Cardiovascular: Regular rate and rhythm with normal S1/S2 without murmurs, rubs or gallops. Abdomen: Soft, non-tender, non-distended with normal bowel sounds. Musculoskeletal: No clubbing, cyanosis or edema bilaterally. Full range of motion without deformity. Skin: Skin color, texture, turgor normal.  No rashes or lesions. Neurologic:  Unable to cooperate with exam. Cranial nerves: II-XII intact, grossly non-focal.  Psychiatric: Nonverbal  Capillary Refill: Brisk,< 3 seconds   Peripheral Pulses: +2 palpable, equal bilaterally       Labs:   Recent Labs     08/20/21  0435 08/21/21  0612 08/22/21  0605   WBC 8.7 9.3 9.0   HGB 8.2* 8.3* 7.6*   HCT 23.8* 24.4* 22.4*    314 338     Recent Labs     08/20/21  0435 08/21/21  0612 08/22/21  0605    134* 136   K 3.5 3.5 3.5   CL 96* 93* 98*   CO2 28 25 25   BUN 14 14 19   CREATININE 0.9 0.7 0.8   CALCIUM 8.7 8.6 8.9   PHOS 3.5 3.0 3.2     No results for input(s): AST, ALT, BILIDIR, BILITOT, ALKPHOS in the last 72 hours. No results for input(s): INR in the last 72 hours. No results for input(s): [place] in the last 72 hours.     Urinalysis:    No results found for: Serene Dose, BACTERIA, RBCUA, BLOODU, SPECGRAV, GLUCOSEU    Radiology:  FL MODIFIED BARIUM SWALLOW W VIDEO   Final Result   Penetration was seen. No definite aspiration      Please see separate speech pathology report for full discussion of findings   and recommendations. XR CHEST PORTABLE   Final Result   1. The endotracheal tube tip is 2-3 cm above the tariq. 2. The tip of the enteric tube is probably in a hiatal hernia as it remains   above the diaphragm and is partially coiled. XR CHEST PORTABLE   Final Result   COPD, with bronchial thickening and interstitial airspace disease in the left   infrahilar region and right lung base laterally suspicious for superimposed   pneumonitis.                  Assessment/Plan:    Active Hospital Problems    Diagnosis Date Noted    Acute aspiration pneumonia ([address] [J69.0] 08/19/2021    Moderate malnutrition ([address] [E44.0] 08/19/2021    Acute respiratory failure with hypoxia (HCC) [J96.01]        Shortness of breath likely 2/2 aspiration pneumonia  -given dose of Vanc and cefepime in the ED, continue with cefepime  -D-dimer minimally elevated     Sepsis 2/2 aspiration pneumonia  Meets at least 2 SIRS Criteria:  Tachypnea > 20  HR > 90  WBC > 12K  Source: lungs  -lactic acid q6h  -blood cultures x 2 drawn prior to administration of antibiotics  -IV antibiotics: cefepime  -IVF: 30 cc/kg given     Acute respiratory failure with hypoxia s/p mechanical intubation  -extubated 8/19, weaned to room air  -management as above  -wean oxygen to maintain SpO2 > 89%  -ventilator management per critical care     Anion gap metabolic acidosis  -bicarb gtt, stopped with improvement  -continue to trend and monitor    Oropharyngeal dysphagia  -family wants PEG tube placement  -GI consulted  -palliative care involved  -family wants hospice care at discharge     Depression  -continue home meds     Essential hypertension  -continue home meds  -IV Labetalol PRN for SBP > 180 as patient is not taking anything by mouth     Dementia  -continue home meds     Hypomagnesemia  -replace PRN    Catatonia  -improved after given IV Ativan       DVT Prophylaxis: heparin  Diet: ADULT DIET; Dysphagia - Pureed; Moderately Thick (Honey)  Code Status: Limited    PT/OT Eval Status: ordered    Dispo - continue care, watch PO intake over the weekend and consider PEG on Monday. NPO after midnight.     Alix Russell MD

## 2021-08-22 NOTE — PROGRESS NOTES
INPATIENT CONSULTATION:    IDENTIFYING DATA/REASON FOR CONSULTATION   PATIENT:  Karyle Colder  MRN:  [id number]  ADMIT DATE: 8/19/2021  TIME OF EVALUATION: 8/22/2021 8:09 AM  HOSPITAL STAY:   LOS: 3 days   CONSULTING PHYSICIAN: Tiffany Stevens MD   REASON FOR CONSULTATION: Dysphagia    Subjective:    Patient seen and examined in follow-up. Patient does not provide any history and does not interact. Per nursing notes, patient refused medications and oral intake overnight. MEDICATIONS   SCHEDULED:  insulin lispro, 0-3 Units, Q4H  pantoprazole, 40 mg, BID   And  sodium chloride (PF), 10 mL, BID  sodium chloride flush, 10 mL, 2 times per day  lactobacillus, 2 capsule, BID WC  mupirocin, , BID  cefepime, 2,000 mg, Q12H  ARIPiprazole, 5 mg, BID  FLUoxetine, 20 mg, BID  mirtazapine, 7.5 mg, Nightly  heparin (porcine), 5,000 Units, BID  valsartan, 160 mg, Daily      FLUIDS/DRIPS:     sodium chloride      dextrose       PRNs: labetalol, 10 mg, Q6H PRN  sodium chloride flush, 10 mL, PRN  sodium chloride, 25 mL, PRN  ondansetron, 4 mg, Q4H PRN  polyethylene glycol, 17 g, Daily PRN  acetaminophen, 650 mg, Q4H PRN   Or  acetaminophen, 650 mg, Q4H PRN  glucose, 15 g, PRN  dextrose, 12.5 g, PRN  glucagon (rDNA), 1 mg, PRN  dextrose, 100 mL/hr, PRN      ALLERGIES:    Allergies   Allergen Reactions    Buspar [Buspirone]     Ciprofloxacin     Food      STRAWBERRIES     Januvia [Sitagliptin]          PHYSICAL EXAM     Vitals:    08/21/21 1943 08/22/21 0023 08/22/21 0346 08/22/21 0526   BP: (!) 164/78 (!) 160/74 (!) 165/79    Pulse: 83 85 91    Resp: 14 13 15    Temp: 97.8 °F (36.6 °C) 98.6 °F (37 °C) 98.4 °F (36.9 °C)    TempSrc: Axillary Axillary Axillary    SpO2: 99% 97% 97%    Weight:    82 lb 14.3 oz (37.6 kg)   Height:           I/O last 3 completed shifts: In: 438.5 [IV Piggyback:438.5]  Out: 400 [Urine:400]    Physical Exam:  Gen: Resting in bed, NAD   HEENT: normocephalic, atraumatic. No scleral icterus.    CV: RRR no MRG   Pul: CTAB, normal WOB   Abd: Good bowel sounds throughout, soft, NT/ND, no masses, no HSM   Ext: No edema, atraumatic   Neuro: No gross cranial nerve deficits, patient does not participate in exam  Skin: No jaundice, spider angiomas, tello erythema     LABS AND IMAGING     Recent Results (from the past 24 hour(s))   POCT Glucose    Collection Time: 08/21/21 11:55 AM   Result Value Ref Range    POC Glucose 83 70 - 99 mg/dl    Performed on ACCU-CHEK    POCT Glucose    Collection Time: 08/21/21  3:20 PM   Result Value Ref Range    POC Glucose 80 70 - 99 mg/dl    Performed on ACCU-CHEK    POCT Glucose    Collection Time: 08/21/21  7:44 PM   Result Value Ref Range    POC Glucose 73 70 - 99 mg/dl    Performed on ACCU-CHEK    POCT Glucose    Collection Time: 08/22/21  2:51 AM   Result Value Ref Range    POC Glucose 89 70 - 99 mg/dl    Performed on ACCU-CHEK    Basic Metabolic Panel w/ Reflex to MG    Collection Time: 08/22/21  6:05 AM   Result Value Ref Range    Sodium 136 136 - 145 mmol/L    Potassium reflex Magnesium 3.5 3.5 - 5.1 mmol/L    Chloride 98 (L) 99 - 110 mmol/L    CO2 25 21 - 32 mmol/L    Anion Gap 13 3 - 16    Glucose 84 70 - 99 mg/dL    BUN 19 7 - 20 mg/dL    CREATININE 0.8 0.6 - 1.2 mg/dL    GFR Non-African American >60 >60    GFR African American >60 >60    Calcium 8.9 8.3 - 10.6 mg/dL   CBC auto differential    Collection Time: 08/22/21  6:05 AM   Result Value Ref Range    WBC 9.0 4.0 - 11.0 K/uL    RBC 2.60 (L) 4.00 - 5.20 M/uL    Hemoglobin 7.6 (L) 12.0 - 16.0 g/dL    Hematocrit 22.4 (L) 36.0 - 48.0 %    MCV 86.2 80.0 - 100.0 fL    MCH 29.4 26.0 - 34.0 pg    MCHC 34.1 31.0 - 36.0 g/dL    RDW 16.3 (H) 12.4 - 15.4 %    Platelets 998 714 - 078 K/uL    MPV 7.5 5.0 - 10.5 fL    Neutrophils % 80.8 %    Lymphocytes % 5.8 %    Monocytes % 12.2 %    Eosinophils % 0.6 %    Basophils % 0.6 %    Neutrophils Absolute 7.3 1.7 - 7.7 K/uL    Lymphocytes Absolute 0.5 (L) 1.0 - 5.1 K/uL    Monocytes Absolute 1.1 0.0 - 1.3 K/uL    Eosinophils Absolute 0.1 0.0 - 0.6 K/uL    Basophils Absolute 0.1 0.0 - 0.2 K/uL   Magnesium    Collection Time: 08/22/21  6:05 AM   Result Value Ref Range    Magnesium 1.90 1.80 - 2.40 mg/dL   Phosphorus    Collection Time: 08/22/21  6:05 AM   Result Value Ref Range    Phosphorus 3.2 2.5 - 4.9 mg/dL   POCT Glucose    Collection Time: 08/22/21  6:10 AM   Result Value Ref Range    POC Glucose 85 70 - 99 mg/dl    Performed on ACCU-CHEK      Other Labs    Imaging    ASSESSMENT AND RECOMMENDATIONS   Gloria Jordan is a 68-year-old female with past medical history of dementia, hypertension, esophageal stricture, and GERD with history of Nissen fundoplication who presented to [address] [place] [address] 8/19/2021 with dyspnea and hypoxia.     1. Dysphagia with anorexia: Recent speech evaluation recommended patient is high risk for aspiration due to oropharyngeal dysphagia, and recommended consideration of honey thickened liquids for pleasure eating versus alternative means for nutritional access. I had a lengthy discussion with the patient's daughter regarding PEG tube placement, continued risk for aspiration despite PEG. The patient has not consumed any calories over the weekend, as she is refusing food. Will plan for PEG tube placement 8/23/21. I attempted to contact the patient's daughter today to confirm this plan and obtain consent, but I was unable to reach her, but left a voicemail. 2. Dementia: Continue current therapy. 3. Failure to thrive: Agree with remeron for possible appetite stimulation. Recommend PEG tube placement if this aligns with family's wishes and goals of care. 4. Severe protein malnutrition: Agree with remeron for possible appetite stimulation. Recommend PEG tube placement if this aligns with family's wishes and goals of care. 5. Aspiration pneumonia: On Vanc and Cefepime.  Defer antibiotic therapy to primary team    RECOMMENDATIONS:    - NPO at midnight  - EGD with PEG 8/23/21 pending signed informed consent from daughter. If you have any questions or need any further information, please feel free to contact anyone on our consult team.  Thank you for allowing us to participate in the care of Jamal Graff. [facility].  [address]

## 2021-08-22 NOTE — PROGRESS NOTES
Patient moved to chair. Incontinent x1 with BM as charted. Tolerating PO well this shift - asking for food and water. Verbalized pain in back. Pain resolved with repositioning, pain interventions, and analgesic per eMAR. Corrected insulin x1. Updated family with plan and continuing to monitor.

## 2021-08-22 NOTE — PLAN OF CARE
Problem: Nutrition  Goal: Optimal nutrition therapy  8/22/2021 0742 by Solis Figueroa RN  Outcome: Ongoing  Note: Patient with decreased appetite/tolerating some PO. Encouraging PO intake. Problem: Pain:  Goal: Pain level will decrease  Description: Pain level will decrease  8/22/2021 0742 by Solis Figueroa RN  Outcome: Ongoing  Note: No complaints of pain at this time. Continuing to monitor. Problem: Pain:  Goal: Control of acute pain  Description: Control of acute pain  8/22/2021 0742 by Solis Figueroa RN  Outcome: Ongoing  Note: Patient with advanced dementia. Continuing to monitor. Problem: Pain:  Goal: Control of chronic pain  Description: Control of chronic pain  8/22/2021 0742 by Solis Figueroa RN  Outcome: Ongoing     Problem: Skin Integrity:  Goal: Will show no infection signs and symptoms  Description: Will show no infection signs and symptoms  8/22/2021 0742 by Solis Figueroa RN  Outcome: Ongoing  Note: No s/s of infection at this time. Continuing to monitor. Problem: Skin Integrity:  Goal: Absence of new skin breakdown  Description: Absence of new skin breakdown  8/22/2021 0742 by Solis Figueroa RN  Outcome: Ongoing     Problem: Falls - Risk of:  Goal: Will remain free from falls  Description: Will remain free from falls  8/22/2021 0742 by Solis Figueroa RN  Outcome: Ongoing  Note: Fall risk assessment completed. Fall precautions in place. Call light within reach. Pt educated on calling for assistance before getting up. Walkway free of clutter. Will continue to monitor. Problem: Falls - Risk of:  Goal: Absence of physical injury  Description: Absence of physical injury  8/22/2021 6910 by Solis Figueroa RN  Outcome: Ongoing  Note: Pt is free of injury. No injury noted. Fall precautions in place. Call light within reach. Will monitor.

## 2021-08-22 NOTE — PROGRESS NOTES
Plan of care discussed with family member. Dr. Arnaud Rodriguez with GI called to advise on consents per family's request. Family verbalizes understanding that consents need to be signed tomorrow with the consulting surgeon. No additional concerns at this time. Anti anxiety medication given x1. Continuing to monitor.

## 2021-08-23 ENCOUNTER — ANESTHESIA (OUTPATIENT)
Dept: ENDOSCOPY | Age: 76
DRG: 871 | End: 2021-08-23
Payer: MEDICARE

## 2021-08-23 ENCOUNTER — ANESTHESIA EVENT (OUTPATIENT)
Dept: ENDOSCOPY | Age: 76
DRG: 871 | End: 2021-08-23
Payer: MEDICARE

## 2021-08-23 VITALS
OXYGEN SATURATION: 97 % | DIASTOLIC BLOOD PRESSURE: 56 MMHG | SYSTOLIC BLOOD PRESSURE: 114 MMHG | TEMPERATURE: 98.6 F | RESPIRATION RATE: 1 BRPM

## 2021-08-23 LAB
ANION GAP SERPL CALCULATED.3IONS-SCNC: 9 MMOL/L (ref 3–16)
BASOPHILS ABSOLUTE: 0 K/UL (ref 0–0.2)
BASOPHILS RELATIVE PERCENT: 0.5 %
BLOOD CULTURE, ROUTINE: NORMAL
BLOOD CULTURE, ROUTINE: NORMAL
BUN BLDV-MCNC: 20 MG/DL (ref 7–20)
CALCIUM SERPL-MCNC: 8.6 MG/DL (ref 8.3–10.6)
CHLORIDE BLD-SCNC: 100 MMOL/L (ref 99–110)
CO2: 26 MMOL/L (ref 21–32)
CREAT SERPL-MCNC: 0.6 MG/DL (ref 0.6–1.2)
EOSINOPHILS ABSOLUTE: 0.1 K/UL (ref 0–0.6)
EOSINOPHILS RELATIVE PERCENT: 1.5 %
GFR AFRICAN AMERICAN: >60
GFR NON-AFRICAN AMERICAN: >60
GLUCOSE BLD-MCNC: 105 MG/DL (ref 70–99)
GLUCOSE BLD-MCNC: 106 MG/DL (ref 70–99)
GLUCOSE BLD-MCNC: 108 MG/DL (ref 70–99)
GLUCOSE BLD-MCNC: 109 MG/DL (ref 70–99)
GLUCOSE BLD-MCNC: 121 MG/DL (ref 70–99)
GLUCOSE BLD-MCNC: 138 MG/DL (ref 70–99)
HCT VFR BLD CALC: 22.3 % (ref 36–48)
HEMOGLOBIN: 7.4 G/DL (ref 12–16)
LYMPHOCYTES ABSOLUTE: 0.9 K/UL (ref 1–5.1)
LYMPHOCYTES RELATIVE PERCENT: 12 %
MAGNESIUM: 1.8 MG/DL (ref 1.8–2.4)
MCH RBC QN AUTO: 28.6 PG (ref 26–34)
MCHC RBC AUTO-ENTMCNC: 33.4 G/DL (ref 31–36)
MCV RBC AUTO: 85.8 FL (ref 80–100)
MONOCYTES ABSOLUTE: 1 K/UL (ref 0–1.3)
MONOCYTES RELATIVE PERCENT: 12.6 %
NEUTROPHILS ABSOLUTE: 5.6 K/UL (ref 1.7–7.7)
NEUTROPHILS RELATIVE PERCENT: 73.4 %
PDW BLD-RTO: 16.3 % (ref 12.4–15.4)
PERFORMED ON: ABNORMAL
PHOSPHORUS: 2.3 MG/DL (ref 2.5–4.9)
PLATELET # BLD: 390 K/UL (ref 135–450)
PMV BLD AUTO: 7.2 FL (ref 5–10.5)
POTASSIUM REFLEX MAGNESIUM: 3.5 MMOL/L (ref 3.5–5.1)
RBC # BLD: 2.6 M/UL (ref 4–5.2)
SARS-COV-2, NAAT: NOT DETECTED
SODIUM BLD-SCNC: 135 MMOL/L (ref 136–145)
WBC # BLD: 7.7 K/UL (ref 4–11)

## 2021-08-23 PROCEDURE — 80048 BASIC METABOLIC PNL TOTAL CA: CPT

## 2021-08-23 PROCEDURE — 2580000003 HC RX 258: Performed by: INTERNAL MEDICINE

## 2021-08-23 PROCEDURE — 6360000002 HC RX W HCPCS: Performed by: NURSE PRACTITIONER

## 2021-08-23 PROCEDURE — 2709999900 HC NON-CHARGEABLE SUPPLY: Performed by: INTERNAL MEDICINE

## 2021-08-23 PROCEDURE — 6370000000 HC RX 637 (ALT 250 FOR IP): Performed by: INTERNAL MEDICINE

## 2021-08-23 PROCEDURE — 85025 COMPLETE CBC W/AUTO DIFF WBC: CPT

## 2021-08-23 PROCEDURE — C9113 INJ PANTOPRAZOLE SODIUM, VIA: HCPCS | Performed by: PHYSICIAN ASSISTANT

## 2021-08-23 PROCEDURE — 3700000001 HC ADD 15 MINUTES (ANESTHESIA): Performed by: INTERNAL MEDICINE

## 2021-08-23 PROCEDURE — 6360000002 HC RX W HCPCS: Performed by: INTERNAL MEDICINE

## 2021-08-23 PROCEDURE — 87635 SARS-COV-2 COVID-19 AMP PRB: CPT

## 2021-08-23 PROCEDURE — 3609013300 HC EGD TUBE PLACEMENT: Performed by: INTERNAL MEDICINE

## 2021-08-23 PROCEDURE — 83735 ASSAY OF MAGNESIUM: CPT

## 2021-08-23 PROCEDURE — 6370000000 HC RX 637 (ALT 250 FOR IP): Performed by: NURSE PRACTITIONER

## 2021-08-23 PROCEDURE — 0DH63UZ INSERTION OF FEEDING DEVICE INTO STOMACH, PERCUTANEOUS APPROACH: ICD-10-PCS | Performed by: INTERNAL MEDICINE

## 2021-08-23 PROCEDURE — 94761 N-INVAS EAR/PLS OXIMETRY MLT: CPT

## 2021-08-23 PROCEDURE — 3609012400 HC EGD TRANSORAL BIOPSY SINGLE/MULTIPLE: Performed by: INTERNAL MEDICINE

## 2021-08-23 PROCEDURE — 84100 ASSAY OF PHOSPHORUS: CPT

## 2021-08-23 PROCEDURE — 7100000001 HC PACU RECOVERY - ADDTL 15 MIN: Performed by: INTERNAL MEDICINE

## 2021-08-23 PROCEDURE — 7100000000 HC PACU RECOVERY - FIRST 15 MIN: Performed by: INTERNAL MEDICINE

## 2021-08-23 PROCEDURE — 36592 COLLECT BLOOD FROM PICC: CPT

## 2021-08-23 PROCEDURE — 6360000002 HC RX W HCPCS: Performed by: NURSE ANESTHETIST, CERTIFIED REGISTERED

## 2021-08-23 PROCEDURE — 1200000000 HC SEMI PRIVATE

## 2021-08-23 PROCEDURE — 6360000002 HC RX W HCPCS: Performed by: PHYSICIAN ASSISTANT

## 2021-08-23 PROCEDURE — 2580000003 HC RX 258: Performed by: PHYSICIAN ASSISTANT

## 2021-08-23 PROCEDURE — 88305 TISSUE EXAM BY PATHOLOGIST: CPT

## 2021-08-23 PROCEDURE — 3700000000 HC ANESTHESIA ATTENDED CARE: Performed by: INTERNAL MEDICINE

## 2021-08-23 PROCEDURE — 2500000003 HC RX 250 WO HCPCS: Performed by: INTERNAL MEDICINE

## 2021-08-23 PROCEDURE — 2500000003 HC RX 250 WO HCPCS: Performed by: NURSE ANESTHETIST, CERTIFIED REGISTERED

## 2021-08-23 RX ORDER — DIPHENHYDRAMINE HYDROCHLORIDE 50 MG/ML
12.5 INJECTION INTRAMUSCULAR; INTRAVENOUS
Status: ACTIVE | OUTPATIENT
Start: 2021-08-23 | End: 2021-08-23

## 2021-08-23 RX ORDER — PROPOFOL 10 MG/ML
INJECTION, EMULSION INTRAVENOUS CONTINUOUS PRN
Status: DISCONTINUED | OUTPATIENT
Start: 2021-08-23 | End: 2021-08-23 | Stop reason: SDUPTHER

## 2021-08-23 RX ORDER — PROMETHAZINE HYDROCHLORIDE 25 MG/ML
6.25 INJECTION, SOLUTION INTRAMUSCULAR; INTRAVENOUS
Status: ACTIVE | OUTPATIENT
Start: 2021-08-23 | End: 2021-08-23

## 2021-08-23 RX ORDER — ONDANSETRON 2 MG/ML
4 INJECTION INTRAMUSCULAR; INTRAVENOUS
Status: ACTIVE | OUTPATIENT
Start: 2021-08-23 | End: 2021-08-23

## 2021-08-23 RX ORDER — SODIUM CHLORIDE 9 MG/ML
INJECTION, SOLUTION INTRAVENOUS CONTINUOUS
Status: DISCONTINUED | OUTPATIENT
Start: 2021-08-23 | End: 2021-08-27 | Stop reason: HOSPADM

## 2021-08-23 RX ORDER — LIDOCAINE HYDROCHLORIDE 20 MG/ML
INJECTION, SOLUTION INFILTRATION; PERINEURAL PRN
Status: DISCONTINUED | OUTPATIENT
Start: 2021-08-23 | End: 2021-08-23 | Stop reason: SDUPTHER

## 2021-08-23 RX ORDER — LABETALOL HYDROCHLORIDE 5 MG/ML
5 INJECTION, SOLUTION INTRAVENOUS EVERY 10 MIN PRN
Status: DISCONTINUED | OUTPATIENT
Start: 2021-08-23 | End: 2021-08-25 | Stop reason: ALTCHOICE

## 2021-08-23 RX ADMIN — Medication 2 CAPSULE: at 16:58

## 2021-08-23 RX ADMIN — SODIUM CHLORIDE, PRESERVATIVE FREE 10 ML: 5 INJECTION INTRAVENOUS at 22:39

## 2021-08-23 RX ADMIN — Medication 20 MG: at 22:36

## 2021-08-23 RX ADMIN — CEFEPIME HYDROCHLORIDE 2000 MG: 2 INJECTION, POWDER, FOR SOLUTION INTRAVENOUS at 02:18

## 2021-08-23 RX ADMIN — ARIPIPRAZOLE 5 MG: 5 TABLET ORAL at 22:37

## 2021-08-23 RX ADMIN — Medication 10 ML: at 22:37

## 2021-08-23 RX ADMIN — LIDOCAINE HYDROCHLORIDE 50 MG: 20 INJECTION, SOLUTION INFILTRATION; PERINEURAL at 13:05

## 2021-08-23 RX ADMIN — LORAZEPAM 1 MG: 2 INJECTION INTRAMUSCULAR; INTRAVENOUS at 16:58

## 2021-08-23 RX ADMIN — MUPIROCIN: 20 OINTMENT TOPICAL at 09:37

## 2021-08-23 RX ADMIN — VALSARTAN 160 MG: 160 TABLET, FILM COATED ORAL at 18:37

## 2021-08-23 RX ADMIN — MUPIROCIN: 20 OINTMENT TOPICAL at 22:54

## 2021-08-23 RX ADMIN — SODIUM CHLORIDE: 9 INJECTION, SOLUTION INTRAVENOUS at 22:39

## 2021-08-23 RX ADMIN — MIRTAZAPINE 7.5 MG: 15 TABLET, ORALLY DISINTEGRATING ORAL at 22:37

## 2021-08-23 RX ADMIN — PANTOPRAZOLE SODIUM 40 MG: 40 INJECTION, POWDER, FOR SOLUTION INTRAVENOUS at 22:36

## 2021-08-23 RX ADMIN — PROPOFOL 150 MCG/KG/MIN: 10 INJECTION, EMULSION INTRAVENOUS at 13:11

## 2021-08-23 RX ADMIN — ACETAMINOPHEN 650 MG: 325 TABLET ORAL at 18:37

## 2021-08-23 RX ADMIN — CEFEPIME HYDROCHLORIDE 2000 MG: 2 INJECTION, POWDER, FOR SOLUTION INTRAVENOUS at 15:28

## 2021-08-23 RX ADMIN — SODIUM CHLORIDE: 9 INJECTION, SOLUTION INTRAVENOUS at 02:16

## 2021-08-23 RX ADMIN — LABETALOL HYDROCHLORIDE 10 MG: 5 INJECTION INTRAVENOUS at 15:26

## 2021-08-23 RX ADMIN — HEPARIN SODIUM 5000 UNITS: 5000 INJECTION INTRAVENOUS; SUBCUTANEOUS at 22:37

## 2021-08-23 ASSESSMENT — PAIN SCALES - PAIN ASSESSMENT IN ADVANCED DEMENTIA (PAINAD)
CONSOLABILITY: 0
FACIALEXPRESSION: 0
BREATHING: 0
BODYLANGUAGE: 0
TOTALSCORE: 0
BREATHING: 0
BODYLANGUAGE: 0
TOTALSCORE: 0
FACIALEXPRESSION: 0
BREATHING: 0
TOTALSCORE: 0
BODYLANGUAGE: 0
FACIALEXPRESSION: 0
CONSOLABILITY: 0
NEGVOCALIZATION: 0
CONSOLABILITY: 0

## 2021-08-23 ASSESSMENT — PULMONARY FUNCTION TESTS
PIF_VALUE: 1
PIF_VALUE: 1
PIF_VALUE: 0
PIF_VALUE: 1
PIF_VALUE: 1
PIF_VALUE: 0
PIF_VALUE: 1
PIF_VALUE: 0
PIF_VALUE: 1
PIF_VALUE: 0
PIF_VALUE: 1

## 2021-08-23 ASSESSMENT — PAIN SCALES - GENERAL
PAINLEVEL_OUTOF10: 0

## 2021-08-23 NOTE — PROGRESS NOTES
Comprehensive Nutrition Assessment    Type and Reason for Visit:  Reassess    Nutrition Recommendations/Plan:   Monitor intake on pureed, honey thick liquid diet  Add Magic Cup bid  If PEG done, recommend  Jevity 1.5 with goal rate of 40 ml/hr (based on 20 hrs/day d/t nursing      care interruption. Provides 1200 calories, 51 gms protein, 608 ml free water and 1600 ml total      volume. Nutrition Assessment:  Follow-up. PEG pending intake over weekend which was recorded as only 1-25%, however previously 0%. MBS was completed and SLP recommend pureed diet with honey thick liquids, however concerned about adequacy of intake due to level of dysphagia. Will add Magic Cup bid. Family considering PEG and then home with Hospice care. If PEG done recommend Jevity 1.5 at goal rate of 40 ml/hr (based on 20 hrs per day due to nursing care interruptions). Malnutrition Assessment:  Malnutrition Status: Moderate malnutrition    Context:  Chronic Illness     Findings of the 6 clinical characteristics of malnutrition:  Energy Intake:  Unable to assess  Weight Loss:  7 - Greater than 10% over 6 months     Body Fat Loss:   (moderate) Orbital, Buccal region   Muscle Mass Loss:   (moderate) Temples (temporalis)  Fluid Accumulation:  No significant fluid accumulation     Strength:  Not Performed    Estimated Daily Nutrient Needs:  Energy (kcal):  9032-5056 kcal( 30-35 kcal/kg 36 kg CBW); Weight Used for Energy Requirements:  Current     Protein (g):  36-47 gm (1-1.3gm/kg 36 kg CBW); Weight Used for Protein Requirements:  Current        Fluid (ml/day):  1 mL/kcal; Method Used for Fluid Requirements:  1 ml/kcal      Nutrition Related Findings:  BM 8/22, +2 periorbital edema, labs reviewed      Wounds:  None       Current Nutrition Therapies:    ADULT DIET; Dysphagia - Pureed;  Moderately Thick (Honey)    Anthropometric Measures:  · Height: 4' 10\" (147.3 cm)  · Current Body Weight: 82 lb 10.8 oz (37.5 kg)   · Admission Body Weight: 84 lb (38.1 kg)    · Usual Body Weight:       · Ideal Body Weight: 90 lbs; % Ideal Body Weight 90 %   · BMI: 17.3  · BMI Categories: Underweight (BMI less than 22) age over 72       Nutrition Diagnosis:   · Moderate malnutrition related to inadequate protein-energy intake as evidenced by moderate muscle loss, moderate loss of subcutaneous fat, weight loss greater than or equal to 10% in 6 months      Nutrition Interventions:   Food and/or Nutrient Delivery:  Continue Current Diet  Nutrition Education/Counseling:  No recommendation at this time   Coordination of Nutrition Care:  Continue to monitor while inpatient, Speech Therapy    Goals:  Initiate most appropriate form of nutrition       Nutrition Monitoring and Evaluation:   Behavioral-Environmental Outcomes:  None Identified   Food/Nutrient Intake Outcomes:  Food and Nutrient Intake, Supplement Intake  Physical Signs/Symptoms Outcomes:  Biochemical Data, Weight, Skin, Nutrition Focused Physical Findings     Discharge Planning:     Too soon to determine     Electronically signed by Umm Villegas RD, LD on 8/23/21 at 9:44 AM EDT    Contact: [phone]

## 2021-08-23 NOTE — PROGRESS NOTES
Pt in bed resting with eyes closed call light in reach. .no complaints at this time  Vitals signs are stable.   Intake and output are being recorded, will continue to monitor

## 2021-08-23 NOTE — PLAN OF CARE
Problem: Nutrition  Goal: Optimal nutrition therapy  Outcome: Ongoing     Problem: Pain:  Goal: Pain level will decrease  Description: Pain level will decrease  Outcome: Ongoing  Note: Pain /discomfort being managed with PRN analgesics per MD orders. Patient able to express presence and absence of pain and rate pain appropriately using numerical scale. Goal: Control of acute pain  Description: Control of acute pain  Outcome: Ongoing  Goal: Control of chronic pain  Description: Control of chronic pain  Outcome: Ongoing     Problem: Skin Integrity:  Goal: Will show no infection signs and symptoms  Description: Will show no infection signs and symptoms  Outcome: Ongoing  Note: Pt assessed for infection, No signs or symptoms of surgical site noted. VVS, WBC WNL. Reviewed information with pt and family, pt verbalized understanding     Goal: Absence of new skin breakdown  Description: Absence of new skin breakdown  Outcome: Ongoing     Problem: Falls - Risk of:  Goal: Will remain free from falls  Description: Will remain free from falls  Outcome: Ongoing  Note: Fall risk assessment completed . Fall precautions in place, bed/ chair alarm on, side rails 2/4 up, call light in reach, educated pt on calling for assistance when needed, room clear of clutter. Pt verbalized understanding. Goal: Absence of physical injury  Description: Absence of physical injury  Outcome: Ongoing  Note: No falls noted this shift. Patient ambulates with x1 staff assistance without difficulty. Family member at bedside, spent the night. Bed kept in low position. Safe environment maintained. Bedside table & call light in reach. Uses call light appropriately when needing assistance.

## 2021-08-23 NOTE — PROGRESS NOTES
Pt non verbal and non interacting with staff. Vitals signs are stable.   Intake and output are being recorded, will continue to monitor

## 2021-08-23 NOTE — PROGRESS NOTES
Medications administered and monitored by CRNA, see anesthesia record.     Electronically signed by Douglas Rosario RN on 8/23/2021 at 12:44 PM

## 2021-08-23 NOTE — PROGRESS NOTES
Speech Language Pathology    Patient noted with plan for PEG placement this afternoon. ST to hold attempt for PO trials at this time with plan to re-attempt at a later date as schedule permits unless otherwise notified. Thank you. Bridget Swain, [address] [facility], [id number]  Speech-Language Pathologist  Portable phone: [phone]

## 2021-08-23 NOTE — PLAN OF CARE
Problem: Nutrition  Goal: Optimal nutrition therapy  8/23/2021 0758 by Eri Nick RN  Outcome: Ongoing  Note: Patient will achieve and maintain optimal nutrition therapy. Will monitor and assess. 8/23/2021 0017 by Janessa Mohan RN  Outcome: Ongoing     Problem: Pain:  Goal: Pain level will decrease  Description: Pain level will decrease  8/23/2021 0758 by Eri Nick RN  Outcome: Ongoing  Note: Pt assessed for pain. Pt in pain and assessed with 0-10 pain rating scale. Pt given prescribed analgesic for pain. (See eMar) Pt satisfied with pain relief thus far. Will reassess and continue to monitor. 8/23/2021 0017 by Janessa Mohan RN  Outcome: Ongoing  Note: Pain /discomfort being managed with PRN analgesics per MD orders. Patient able to express presence and absence of pain and rate pain appropriately using numerical scale. Goal: Control of acute pain  Description: Control of acute pain  8/23/2021 0758 by Eri Nick RN  Outcome: Ongoing  Note: Pt assessed for pain. Pt in pain and assessed with 0-10 pain rating scale. Pt given prescribed analgesic for pain. (See eMar) Pt satisfied with pain relief thus far. Will reassess and continue to monitor. 8/23/2021 0017 by Janessa Mohan RN  Outcome: Ongoing  Goal: Control of chronic pain  Description: Control of chronic pain  8/23/2021 0758 by Eri Nick RN  Outcome: Ongoing  Note: Pt assessed for pain. Pt in pain and assessed with 0-10 pain rating scale. Pt given prescribed analgesic for pain. (See eMar) Pt satisfied with pain relief thus far. Will reassess and continue to monitor. 8/23/2021 0017 by Janessa Mohan RN  Outcome: Ongoing     Problem: Skin Integrity:  Goal: Will show no infection signs and symptoms  Description: Will show no infection signs and symptoms  8/23/2021 0758 by Eri Nick RN  Outcome: Ongoing  Note: Pt is free of signs and symptoms of infection. Vital signs stable.  Will monitor. 8/23/2021 0017 by Osmin Mckeon RN  Outcome: Ongoing  Note: Pt assessed for infection, No signs or symptoms of surgical site noted. VVS, WBC WNL. Reviewed information with pt and family, pt verbalized understanding     Goal: Absence of new skin breakdown  Description: Absence of new skin breakdown  8/23/2021 0758 by Nazario Ortega RN  Outcome: Ongoing  Note: Patient will remain free from new skin breakdown. Will monitor and assess. 8/23/2021 0017 by Osmin Mckeon RN  Outcome: Ongoing     Problem: Falls - Risk of:  Goal: Will remain free from falls  Description: Will remain free from falls  8/23/2021 0758 by Nazario Ortega RN  Outcome: Ongoing  Note: Fall risk assessment completed. Fall precautions in place. Call light within reach. Pt educated on calling for assistance before getting up. Walkway free of clutter. Will continue to monitor. 8/23/2021 0017 by sOmin Mckeon RN  Outcome: Ongoing  Note: Fall risk assessment completed . Fall precautions in place, bed/ chair alarm on, side rails 2/4 up, call light in reach, educated pt on calling for assistance when needed, room clear of clutter. Pt verbalized understanding. Goal: Absence of physical injury  Description: Absence of physical injury  8/23/2021 0758 by Nazario Ortega RN  Outcome: Ongoing  Note: Patient will remain free from physical injury. Will monitor and assess. 8/23/2021 0017 by Osmin Mckeon RN  Outcome: Ongoing  Note: No falls noted this shift. Patient ambulates with x1 staff assistance without difficulty. Family member at bedside, spent the night. Bed kept in low position. Safe environment maintained. Bedside table & call light in reach. Uses call light appropriately when needing assistance.

## 2021-08-23 NOTE — PROGRESS NOTES
Area for PEG tube insertion determined by MD via transillumination of the abdomen. Site marked by Surgical Assistant, and cleaned with swabs included in kit. Area anesthetized with injectable 2% lidocaine contained within kit. Small incision made, trocar introduced and visualized via endoscope, and wire introduced through trocar. Wire then grabbed by snare and pulled through with the withdrawal of the scope. MD attached tube to wire and proceeded to pull PEG tube through to 3 cm.        Electronically signed by Joe Brady RN on 8/23/2021 at 1:31 PM

## 2021-08-23 NOTE — PROGRESS NOTES
No falls noted this shift Bed kept in low position. Safe environment maintained. Bedside table & call light in reach. Uses call light appropriately when needing assistance.

## 2021-08-23 NOTE — H&P
Pre-operative History and Physical    Patient: Asim Grade  : 1945  Acct#:     Intended Procedure:  EGD with possible interventions, possible PEG tube placement     HISTORY OF PRESENT ILLNESS:  The patient is a 76 y.o. female  who presents for/due to oropharyngeal dysphagia and aspiration in the setting of advanced dementia. Past Medical History:        Diagnosis Date    Anemia     Catatonic state     Dementia ([facility] [address]     Depression     Hypertension      Past Surgical History:    History reviewed. No pertinent surgical history. Medications Prior to Admission:   No current facility-administered medications on file prior to encounter. Current Outpatient Medications on File Prior to Encounter   Medication Sig Dispense Refill    ARIPiprazole (ABILIFY) 1 MG/ML SOLN solution Take 5 mg by mouth 2 times daily       calcium carbonate (TUMS) 500 MG chewable tablet Take 2 tablets by mouth every 4 hours as needed for Heartburn       Cholecalciferol (VITAMIN D3) 50 MCG ( UT) CAPS Take by mouth      Cholecalciferol (D3-50 PO) Take 5,000 Units by mouth daily      FLUoxetine (PROZAC) 20 MG/5ML solution Take by mouth 2 times daily       Iron Polysacch Lvppz-L72--0.025-1 MG TABS Take 1 tablet by mouth daily       magnesium oxide (MAG-OX) 400 MG tablet Take 400 mg by mouth 2 times daily       mirtazapine (REMERON) 7.5 MG tablet Take 7.5 mg by mouth nightly      polyethylene glycol (GLYCOLAX) 17 g packet Take 17 g by mouth 2 times daily       sucralfate (CARAFATE) 1 GM tablet Take 1 g by mouth 3 times daily (before meals)      valsartan (DIOVAN) 160 MG tablet Take 160 mg by mouth daily      LORazepam (ATIVAN) 2 MG/ML concentrated solution Take 0.26 mg by mouth 3 times daily (before meals). Allergies:  Buspar [buspirone], Ciprofloxacin, Food, and Januvia [sitagliptin]    Social History:   TOBACCO:   has an unknown smoking status.  She has never used smokeless tobacco.  ETOH: reports previous alcohol use. DRUGS:   reports previous drug use. PHYSICAL EXAM:      Vital Signs: BP (!) 165/75   Pulse 89   Temp 98.2 °F (36.8 °C) (Axillary)   Resp 14   Ht 4' 10\" (1.473 m)   Wt 82 lb 10.8 oz (37.5 kg)   SpO2 98%   BMI 17.28 kg/m²    Airway: No stridor or wheezing noted. Good air movement  Pulmonary: without wheezes. Clear to auscultation  Cardiac:regular rate and rhythm without loud murmurs  Abdomen:soft, nontender,  Bowel sounds present    Pre-Procedure Assessment / Plan:  1) Oropharyngeal dysphagia  2) Dementia    ASA Grade:  ASA 3 - Patient with moderate systemic disease with functional limitations  Mallampati Classification:  Class III    Level of Sedation Plan:Deep sedation    Post Procedure plan: Return to same level of care    I assessed the patient's daughter and find that the patient is in satisfactory condition to proceed with the planned procedure and sedation plan. I have explained the risk, benefits, and alternatives to the procedure; the patient and the patient's  and daughter understands and agrees to proceed. The patient's daughter was counseled at length about the risks of joseph Covid-19 during their perioperative period and any recovery window from their procedure. The patient was made aware that joseph Covid-19  may worsen their prognosis for recovering from their procedure  and lend to a higher morbidity and/or mortality risk. All material risks, benefits, and reasonable alternatives including postponing the procedure were discussed. The patient's daughter/primary healthcare agent does wish to proceed with the procedure at this time. The patient has a limited code status (DNR-CCA) and I discussed reversal of the code status to Full code during the EGD and PEG tube placement to be done on 8/23/2021. The patient's code status will revert to [facility] following the procedure.        Valentino Stinson MD  8/23/2021

## 2021-08-23 NOTE — ANESTHESIA POSTPROCEDURE EVALUATION
Department of Anesthesiology  Postprocedure Note    Patient: Regis Rajan  MRN: [id number]  YOB: 1945  Date of evaluation: 8/23/2021  Time:  2:45 PM     Procedure Summary     Date: 08/23/21 Room / Location: [address] [place] [address]    Anesthesia Start: 0359 Anesthesia Stop: 6477    Procedures:       EGD PEG TUBE PLACEMENT (N/A )      EGD BIOPSY Diagnosis: (Dysphagia)    Surgeons: Neal Langston MD Responsible Provider: Mel Scott MD    Anesthesia Type: MAC ASA Status: 3          Anesthesia Type: MAC    Abhijeet Phase I: Abhijeet Score: 7    Abhijeet Phase II:      Last vitals: Reviewed and per EMR flowsheets.        Anesthesia Post Evaluation    Patient location during evaluation: bedside  Patient participation: complete - patient cannot participate  Level of consciousness: awake  Pain score: 0  Nausea & Vomiting: no nausea  Complications: no  Cardiovascular status: hemodynamically stable  Respiratory status: acceptable  Hydration status: stable

## 2021-08-23 NOTE — PROGRESS NOTES
Occupational Therapy  Roberta Ford  [id number]  [id number]  08/23/21    Attempted to see for OT follow up session this pm. Patient is off the floor at this time for PEG placement. Will attempt to see on 8/24/2021 as able. If discharged prior to next OT session please see last daily note for discharge status.     Electronically signed by Maria Alejandra Farmer, [id number] on 8/23/2021 at 2:23 PM

## 2021-08-23 NOTE — ANESTHESIA PRE PROCEDURE
Department of Anesthesiology  Preprocedure Note       Name:  Carlee Lima   Age:  76 y.o.  :  1945                                          MRN:  [id number]         Date:  2021      Surgeon: Sharmaine López):  Jessica Ro MD    Procedure: Procedure(s):  EGD PEG TUBE PLACEMENT    Medications prior to admission:   Prior to Admission medications    Medication Sig Start Date End Date Taking? Authorizing Provider   ARIPiprazole (ABILIFY) 1 MG/ML SOLN solution Take 5 mg by mouth 2 times daily    Yes Historical Provider, MD   calcium carbonate (TUMS) 500 MG chewable tablet Take 2 tablets by mouth every 4 hours as needed for Heartburn    Yes Historical Provider, MD   Cholecalciferol (VITAMIN D3) 50 MCG (2000 UT) CAPS Take by mouth   Yes Historical Provider, MD   Cholecalciferol (D3-50 PO) Take 5,000 Units by mouth daily   Yes Historical Provider, MD   FLUoxetine (PROZAC) 20 MG/5ML solution Take by mouth 2 times daily    Yes Historical Provider, MD   Iron Polysacch Ibmlf-A64--0.025-1 MG TABS Take 1 tablet by mouth daily    Yes Historical Provider, MD   magnesium oxide (MAG-OX) 400 MG tablet Take 400 mg by mouth 2 times daily    Yes Historical Provider, MD   mirtazapine (REMERON) 7.5 MG tablet Take 7.5 mg by mouth nightly   Yes Historical Provider, MD   polyethylene glycol (GLYCOLAX) 17 g packet Take 17 g by mouth 2 times daily    Yes Historical Provider, MD   sucralfate (CARAFATE) 1 GM tablet Take 1 g by mouth 3 times daily (before meals)   Yes Historical Provider, MD   valsartan (DIOVAN) 160 MG tablet Take 160 mg by mouth daily   Yes Historical Provider, MD   LORazepam (ATIVAN) 2 MG/ML concentrated solution Take 0.26 mg by mouth 3 times daily (before meals).    Yes Historical Provider, MD       Current medications:    Current Facility-Administered Medications   Medication Dose Route Frequency Provider Last Rate Last Admin    0.9 % sodium chloride infusion   Intravenous Continuous Arthur Vanegas MD 10 mL/hr at 08/23/21 0216 New Bag at 08/23/21 0216    morphine (PF) injection 2 mg  2 mg Intravenous Q4H PRN Cristofer Young MD        LORazepam (ATIVAN) injection 1 mg  1 mg Intravenous Q6H PRN Cristofer Young MD   1 mg at 08/22/21 1714    labetalol (NORMODYNE;TRANDATE) injection 10 mg  10 mg Intravenous Q6H PRN Cristofer Young MD        insulin lispro (HUMALOG) injection vial 0-3 Units  0-3 Units Subcutaneous Q4H Cristofer Young MD   1 Units at 08/22/21 2131    pantoprazole (PROTONIX) injection 40 mg  40 mg Intravenous BID AGATHA Sebastian   40 mg at 08/22/21 2001    And    sodium chloride (PF) 0.9 % injection 10 mL  10 mL Intravenous BID AGATHA Sebastian   10 mL at 08/22/21 2005    sodium chloride flush 0.9 % injection 10 mL  10 mL Intravenous 2 times per day Yasmine Faulkner MD   10 mL at 08/22/21 2006    sodium chloride flush 0.9 % injection 10 mL  10 mL Intravenous PRN Yasmine Faulkner MD        0.9 % sodium chloride infusion  25 mL Intravenous PRN Yasmine Faulkner MD        ondansetron [facility]) injection 4 mg  4 mg Intravenous Q4H PRN Yasmine Faulkner MD        polyethylene glycol [facility]) packet 17 g  17 g Oral Daily PRN Yasmine Faulkner MD        acetaminophen (TYLENOL) tablet 650 mg  650 mg Oral Q4H PRN Yasmine Faulkner MD   650 mg at 08/22/21 5157    Or    acetaminophen (TYLENOL) suppository 650 mg  650 mg Rectal Q4H PRN Yasmine Faulkner MD        lactobacillus (CULTURELLE) capsule 2 capsule  2 capsule Oral BID  Claudetta Perl, APRN - CNP   2 capsule at 08/20/21 1821    mupirocin (BACTROBAN) 2 % ointment   Nasal BID Claudetta Perl, APRN - CNP   Given at 08/23/21 5819    cefepime (MAXIPIME) 2000 mg IVPB minibag  2,000 mg Intravenous Q12H Leo Arevalo MD   Stopped at 08/23/21 0252    glucose (GLUTOSE) 40 % oral gel 15 g  15 g Oral PRN Claudetta Perl, APRN - CNP        dextrose 50 % IV solution  12.5 g Intravenous PRN Claudetta Perl, APRN - CNP   12.5 g at 08/21/21 2028  glucagon (rDNA) injection 1 mg  1 mg Intramuscular PRN Marilyn Phillips APRN - CNP        dextrose 5 % solution  100 mL/hr Intravenous PRN Consuella Bright, APRN - CNP        ARIPiprazole (ABILIFY) tablet 5 mg  5 mg Oral BID Consuella Bright, APRN - CNP   5 mg at 08/22/21 2001    FLUoxetine (PROZAC) 20 MG/5ML solution 20 mg  20 mg Oral BID Consuella Bright, APRN - CNP   20 mg at 08/22/21 2001    mirtazapine (REMERON SOL-TAB) disintegrating tablet 7.5 mg  7.5 mg Oral Nightly Fernandouella Alan, APRN - CNP   7.5 mg at 08/22/21 2001    heparin (porcine) injection 5,000 Units  5,000 Units Subcutaneous BID Consuella Bright, APRN - CNP   5,000 Units at 08/22/21 2130    valsartan (DIOVAN) tablet 160 mg  160 mg Oral Daily Lorena Mota MD   160 mg at 08/22/21 4112       Allergies: Allergies   Allergen Reactions    Buspar [Buspirone]     Ciprofloxacin     Food      STRAWBERRIES     Januvia [Sitagliptin]        Problem List:    Patient Active Problem List   Diagnosis Code    Acute aspiration pneumonia ([facility] [address] J69.0    Acute respiratory failure with hypoxia ([facility] [address] J96.01    Moderate malnutrition ([facility] [address] E44.0       Past Medical History:        Diagnosis Date    Anemia     Catatonic state     Dementia ([facility] [address]     Depression     Hypertension        Past Surgical History:  History reviewed. No pertinent surgical history.     Social History:    Social History     Tobacco Use    Smoking status: Unknown If Ever Smoked    Smokeless tobacco: Never Used   Substance Use Topics    Alcohol use: Not Currently                                Counseling given: Not Answered      Vital Signs (Current):   Vitals:    08/23/21 0429 08/23/21 0632 08/23/21 0727 08/23/21 1135   BP: (!) 145/70  (!) 157/74 (!) 165/75   Pulse: 87  91 89   Resp: 14  14 14   Temp: 98.4 °F (36.9 °C)  98.8 °F (37.1 °C) 98.2 °F (36.8 °C)   TempSrc: Axillary  Oral Axillary   SpO2: 96%  95% 98%   Weight:  82 lb 10.8 oz (37.5 kg)     Height: BP Readings from Last 3 Encounters:   08/23/21 (!) 165/75       NPO Status:                                                   Date of last liquid consumption: 08/22/21                        Date of last solid food consumption: 08/22/21    BMI:   Wt Readings from Last 3 Encounters:   08/23/21 82 lb 10.8 oz (37.5 kg)     Body mass index is 17.28 kg/m².     CBC:   Lab Results   Component Value Date    WBC 7.7 08/23/2021    RBC 2.60 08/23/2021    HGB 7.4 08/23/2021    HCT 22.3 08/23/2021    MCV 85.8 08/23/2021    RDW 16.3 08/23/2021     08/23/2021       CMP:   Lab Results   Component Value Date     08/23/2021    K 3.5 08/23/2021     08/23/2021    CO2 26 08/23/2021    BUN 20 08/23/2021    CREATININE 0.6 08/23/2021    GFRAA >60 08/23/2021    LABGLOM >60 08/23/2021    GLUCOSE 106 08/23/2021    CALCIUM 8.6 08/23/2021       POC Tests:   Recent Labs     08/23/21  1041   POCGLU 108*       Coags: No results found for: PROTIME, INR, APTT    HCG (If Applicable): No results found for: PREGTESTUR, PREGSERUM, HCG, HCGQUANT     ABGs:   Lab Results   Component Value Date    PHART 7.462 08/19/2021    PO2ART 126.0 08/19/2021    FNV6EOB 30.1 08/19/2021    SCE3GMN 21.5 08/19/2021    BEART -1.6 08/19/2021    M5HEEASI 99.8 08/19/2021        Type & Screen (If Applicable):  No results found for: LABABO, LABRH    Drug/Infectious Status (If Applicable):  No results found for: HIV, HEPCAB    COVID-19 Screening (If Applicable):   Lab Results   Component Value Date    COVID19 Not Detected 08/23/2021    COVID19 Not Detected 08/19/2021           Anesthesia Evaluation  Patient summary reviewed no history of anesthetic complications:   Airway: Mallampati: Unable to assess / NA        Dental:          Pulmonary:                             ROS comment: Acute respiratory failure, recently on ventilator, since extubated   Cardiovascular:    (+) hypertension:,     (-) past MI, CABG/stent and  angina Neuro/Psych:   (+) psychiatric history:            GI/Hepatic/Renal:             Endo/Other:        (-) diabetes mellitus               Abdominal:             Vascular: Other Findings: Dementia            Anesthesia Plan      MAC     ASA 3       Induction: intravenous. Anesthetic plan and risks discussed with patient, child/children and healthcare power of  (Spoke with daughter, Jose G Lopes, on phone for consent). Plan discussed with CRNA.                   Daniella Easton MD   8/23/2021

## 2021-08-23 NOTE — PROGRESS NOTES
Hospitalist Progress Note      PCP: Yuliana Sarkar MD    Date of Admission: 8/19/2021    Chief Complaint: respiratory failure    Hospital Course: The patient is a 76 y.o. female with hx depression, dementia, HTN, and anemia who presents to [facility] with respiratory failure. Patient is currently intubated and sedated and unable to provide any history. According to the notes, patient was having shortness of breath with congestion for the past two days at [facility]. She was saturating 76% on 5L at the nursing home and only responded up to 84% on a nonrebreather. She arrived in the ED in respiratory distress and the ED physician was concerned for aspiration. The daughter was there and stated she was a Limited Code but ok with intubation and so she was intubated in the ED for respiratory failure.     In the ED, labs were significant for a sodium of 130, chloride of 93, biacrb of 19, WBC count of 13.4K. VBG showed a pH of 7.391 and pCO2 of 36.8. CXR showed bronchial thickening and interstitial airspace disease in the left infrahilar region and right lung base laterally. COVID is pending at the time of admission. Subjective: Pt seen and examined after PEG tube placement. She is drowsy and unable to answer questions. Daughter at bedside.     Medications:  Reviewed    Infusion Medications    sodium chloride 10 mL/hr at 08/23/21 0216    sodium chloride      dextrose       Scheduled Medications    insulin lispro  0-3 Units Subcutaneous Q4H    pantoprazole  40 mg Intravenous BID    And    sodium chloride (PF)  10 mL Intravenous BID    sodium chloride flush  10 mL Intravenous 2 times per day    lactobacillus  2 capsule Oral BID WC    mupirocin   Nasal BID    cefepime  2,000 mg Intravenous Q12H    ARIPiprazole  5 mg Oral BID    FLUoxetine  20 mg Oral BID    mirtazapine  7.5 mg Oral Nightly    heparin (porcine)  5,000 Units Subcutaneous BID    valsartan  160 mg Oral Daily     PRN Meds: ondansetron, promethazine, diphenhydrAMINE, labetalol, morphine, LORazepam, labetalol, sodium chloride flush, sodium chloride, ondansetron, polyethylene glycol, acetaminophen **OR** acetaminophen, glucose, dextrose, glucagon (rDNA), dextrose      Intake/Output Summary (Last 24 hours) at 8/23/2021 1528  Last data filed at 8/23/2021 1142  Gross per 24 hour   Intake 120 ml   Output 675 ml   Net -555 ml       Exam:    BP (!) 185/80   Pulse 80   Temp 97.9 °F (36.6 °C) (Axillary)   Resp 14   Ht 4' 10\" (1.473 m)   Wt 82 lb 10.8 oz (37.5 kg)   SpO2 100%   BMI 17.28 kg/m²     General appearance: Thin. Chronically ill appearing. No apparent distress, appears stated age and cooperative. HEENT: Pupils equal, round, and reactive to light. Conjunctivae/corneas clear. Neck: Supple, with full range of motion. No jugular venous distention. Trachea midline. Respiratory:  Normal respiratory effort. Clear to auscultation, bilaterally without Rales/Wheezes/Rhonchi. Cardiovascular: Regular rate and rhythm with normal S1/S2 without murmurs, rubs or gallops. Abdomen: Soft, non-tender, non-distended with normal bowel sounds. Musculoskeletal: No clubbing, cyanosis or edema bilaterally. Full range of motion without deformity. Skin: Skin color, texture, turgor normal.  No rashes or lesions.   Neurologic:  Unable to cooperate with exam. Cranial nerves: II-XII intact, grossly non-focal.  Psychiatric: Nonverbal  Capillary Refill: Brisk,< 3 seconds   Peripheral Pulses: +2 palpable, equal bilaterally       Labs:   Recent Labs     08/21/21  0612 08/22/21  0605 08/23/21  0400   WBC 9.3 9.0 7.7   HGB 8.3* 7.6* 7.4*   HCT 24.4* 22.4* 22.3*    338 390     Recent Labs     08/21/21  0612 08/22/21  0605 08/23/21  0400   * 136 135*   K 3.5 3.5 3.5   CL 93* 98* 100   CO2 25 25 26   BUN 14 19 20   CREATININE 0.7 0.8 0.6   CALCIUM 8.6 8.9 8.6   PHOS 3.0 3.2 2.3*     No results for input(s): AST, ALT, BILIDIR, BILITOT, ALKPHOS in the last 72 hours. No results for input(s): INR in the last 72 hours. No results for input(s): Jadene Moh in the last 72 hours. Urinalysis:    No results found for: Urszula Hernandez, BACTERIA, RBCUA, BLOODU, SPECGRAV, L[address]    Radiology:  FL MODIFIED BARIUM SWALLOW W VIDEO   Final Result   Penetration was seen. No definite aspiration      Please see separate speech pathology report for full discussion of findings   and recommendations. XR CHEST PORTABLE   Final Result   1. The endotracheal tube tip is 2-3 cm above the tariq. 2. The tip of the enteric tube is probably in a hiatal hernia as it remains   above the diaphragm and is partially coiled. XR CHEST PORTABLE   Final Result   COPD, with bronchial thickening and interstitial airspace disease in the left   infrahilar region and right lung base laterally suspicious for superimposed   pneumonitis.                  Assessment/Plan:    Active Hospital Problems    Diagnosis Date Noted    Acute aspiration pneumonia ([address] [J69.0] 08/19/2021    Moderate malnutrition ([address] [E44.0] 08/19/2021    Acute respiratory failure with hypoxia (HCC) [J96.01]        Shortness of breath likely 2/2 aspiration pneumonia  -given dose of Vanc and cefepime in the ED, continue with cefepime (Day 6/7)  -D-dimer minimally elevated     Sepsis 2/2 aspiration pneumonia  Meets at least 2 SIRS Criteria:  Tachypnea > 20  HR > 90  WBC > 12K  Source: lungs  -lactic acid q6h  -blood cultures x 2 drawn prior to administration of antibiotics  -IV antibiotics: cefepime (Day 6/7)  -IVF: 30 cc/kg given     Acute respiratory failure with hypoxia s/p mechanical intubation  -extubated 8/19, weaned to room air  -management as above  -wean oxygen to maintain SpO2 > 89%     Anion gap metabolic acidosis  -bicarb gtt, stopped with improvement  -continue to trend and monitor    Oropharyngeal dysphagia  -family wants PEG tube placement  -GI consulted  -palliative care involved  -family wants hospice care at discharge     Depression  -continue home meds     Essential hypertension  -continue home meds  -IV Labetalol PRN for SBP > 180 as patient is not taking anything by mouth     Dementia  -continue home meds     Hypomagnesemia  -replace PRN    Catatonia  -improved after given IV Ativan       DVT Prophylaxis: heparin  Diet: ADULT DIET; Dysphagia - Pureed; Moderately Thick (Honey)  Adult Oral Nutrition Supplement; Frozen Oral Supplement  Code Status: Limited    PT/OT Eval Status: ordered    Dispo - continue care, start tube feeds tomorrow and possible discharge as early as Wednesday.     Araceli Alejo MD

## 2021-08-23 NOTE — PLAN OF CARE
Problem: Nutrition  Goal: Optimal nutrition therapy  8/23/2021 1937 by Laura Emmanuel RN  Outcome: Ongoing  Note: Will encourage intake  8/23/2021 0949 by Charmayne Coward, RD, LD  Outcome: Ongoing  8/23/2021 0758 by Driss Tejada RN  Outcome: Ongoing  Note: Patient will achieve and maintain optimal nutrition therapy. Will monitor and assess. Problem: Pain:  Goal: Pain level will decrease  Description: Pain level will decrease  8/23/2021 1937 by Laura Emmanuel RN  Outcome: Ongoing  Note: Pt assessed for pain. Pt in pain and assessed with 0-10 pain rating scale. Pt given prescribed analgesic for pain. (See eMar) Pt satisfied with pain relief thus far. Will reassess and continue to monitor. 8/23/2021 0758 by Driss Tejada RN  Outcome: Ongoing  Note: Pt assessed for pain. Pt in pain and assessed with 0-10 pain rating scale. Pt given prescribed analgesic for pain. (See eMar) Pt satisfied with pain relief thus far. Will reassess and continue to monitor. Goal: Control of acute pain  Description: Control of acute pain  8/23/2021 1937 by Laura Emmanuel RN  Outcome: Ongoing  8/23/2021 0758 by Driss Tejada RN  Outcome: Ongoing  Note: Pt assessed for pain. Pt in pain and assessed with 0-10 pain rating scale. Pt given prescribed analgesic for pain. (See eMar) Pt satisfied with pain relief thus far. Will reassess and continue to monitor. Goal: Control of chronic pain  Description: Control of chronic pain  8/23/2021 1937 by Laura Emmanuel RN  Outcome: Ongoing  8/23/2021 0758 by Driss Tejada RN  Outcome: Ongoing  Note: Pt assessed for pain. Pt in pain and assessed with 0-10 pain rating scale. Pt given prescribed analgesic for pain. (See eMar) Pt satisfied with pain relief thus far. Will reassess and continue to monitor.       Problem: Skin Integrity:  Goal: Will show no infection signs and symptoms  Description: Will show no infection signs and symptoms  8/23/2021 1937 by Laura Emmanuel RN  Outcome: Ongoing  Note: Will monitor skin and mucous members. Will turn patient every 2 hours, monitor for friction and sheering, and change dressings as needed. Will preform skin assessment every shift. 8/23/2021 0758 by Awais Adams RN  Outcome: Ongoing  Note: Pt is free of signs and symptoms of infection. Vital signs stable. Will monitor. Goal: Absence of new skin breakdown  Description: Absence of new skin breakdown  8/23/2021 1937 by Elliot Lopez RN  Outcome: Ongoing  8/23/2021 0758 by Awais Adasm RN  Outcome: Ongoing  Note: Patient will remain free from new skin breakdown. Will monitor and assess. Problem: Falls - Risk of:  Goal: Will remain free from falls  Description: Will remain free from falls  8/23/2021 1937 by Elliot Lopez RN  Outcome: Ongoing  Note: Fall risk assessment completed. Fall precautions in place. Call light within reach. Pt educated on calling for assistance before getting up. Walkway free of clutter. Will continue to monitor. 8/23/2021 0758 by Awais Adams RN  Outcome: Ongoing  Note: Fall risk assessment completed. Fall precautions in place. Call light within reach. Pt educated on calling for assistance before getting up. Walkway free of clutter. Will continue to monitor. Goal: Absence of physical injury  Description: Absence of physical injury  8/23/2021 1937 by Elliot Lopez RN  Outcome: Ongoing  8/23/2021 0758 by Awais Adams RN  Outcome: Ongoing  Note: Patient will remain free from physical injury. Will monitor and assess.

## 2021-08-23 NOTE — OP NOTE
Endoscopy Note    Patient: Raeann Emmanuel  :   Acct#:     Procedure: Esophagogastroduodenoscopy with PEG tube placement                         Date:  2021     Surgeon:   Lyly Sánchez MD    Referring Physician:  King Crowell MD    Indications: This is a 76y.o. year old female who presents today with Insertion of PEG/PEJ/NJ tube. Postoperative Diagnosis:  Successful PEG tube placement   6 cm hiatal hernia   LA grade A esophagitis with some nodularity in the distal esophagus. Anesthesia:  Medications given by the anesthesia service via MAC     Consent:  The patient or their legal guardian has signed an informed consent, and is aware of the potential risks, benefits, alternatives, and potential complications of this procedure. These include, but are not limited to hemorrhage, bleeding, post procedural pain, perforation, phlebitis, aspiration, hypotension, hypoxia, cardiovascular events such as arryhthmia, and possibly death. Description of Procedure: The patient was then taken to the endoscopy suite, placed in the left lateral decubitus position and the above IV sedation was administrered. The Olympus video endoscope was placed through the patient's oropharynx without difficulty to the extent of the 2nd portion of the duodenum. Both forward and retroflexed views of the stomach were obtained. Findings:    Esophagus: The esophagus was notable for inflammation at the GE junction with some nodularity in the area. Biopsies were taken of this area. The Z line was located at 30 cm, the GE junction at 34 cm, and the hiatus at 36 cm. Stomach: The stomach was notable for mild punctate erythema and a small suture was present along the lesser curvature of the gastric fundus.      Duodenum: The first and 2nd portions of the duodenum appeared normal with normal villous pattern    Interventions:   Transillumination and finger denting were accomplished successfully through the skin of the anterior abdominal wall in the left upper quadrant. This area was marked, prepped and draped in sterile fashion and infiltrated with 1% Lidocaine solution. A 1 cm transverse skin incision was made with #11 scalpel blade. A #14 gauge Angiocath was then passed through the skin incision puncturing the gastric lumen under endoscopic visualization. The metal trocar was removed and a guidewire was passed through the Angiocath into the gastric lumen where it was grasped with a snare, passed through the scope. The scope snare and guidewire were then pulled back through the proximal stomach, esophagus and out through the patient's mouth, leaving the guidewire protruding from the mouth and protruding from the skin at the skin incision. A #20 Malaysian percutaneous endoscopic gastrostomy tube was then attached to the guidewire at the mouth. The guidewire at the skin was grasped and pulled, pulling the gastrostomy tube and guidewire though the gastrostomy incision until the intragastric bumper was snug against the intragastric wall. A second external bumper was advance over the external portion of the gastrostomy tube and fixed at the 3 cm chelsi at the skin. The gastrostomy incision was then dressed with iodoform ointment and a dry, sterile, gauze dressing. A Y connector was attached to the end of the gastrostomy tube. Bumper placement was confirmed endoscopically. The scope was then withdrawn back into the stomach, it was decompressed, and the scope was completely withdrawn. The patient tolerated the procedure well and was taken to the post anesthesia care unit in good condition. Estimated blood loss: Minimal     ID Type Source Tests Collected by Time Destination   A : Distal Esophagus Biopsy Tissue Esophagus SURGICAL PATHOLOGY Volodymyr De La O MD 8/23/2021 1315      Impression:    1) See post procedure diagnoses    Recommendations:   1.  OK to resume previous diet by mouth.    2.  Convert medications to liquid if possible and infuse through PEG. If no liquid alternative, then crush and mix in 20-30 cc of water and infuse through PEG. OK to start using the tube 2021 at 1800 for water flushes and medications. 3.  Keep head of bed elevated during tube feeds. 4.  Place dressing over top of PEG. Do not place dressing between outer bumper and skin. 5.  Place abdominal binder to prevent patient tampering with the PEG. 6.  Flush PEG with 30mL of water tid and before and after each use. 7.  Please call with any problems with the [phone]. GI will check the tube in the morning prior to using for feeds.      Yoni Haddad MD  [address] and [address] [facility]

## 2021-08-23 NOTE — PROGRESS NOTES
Patient is resting in bed and is not responding to any staff. Patient asked to follow commands and would not. Patient would not answer any questions. Patient just stared at this RN with no response. Patient remains NPO for PEG tube placement today. Morning medication held and head to toe assessment is complete. All needs are met and safety precautions are in place. Will continue to monitor and assess.   Electronically signed by Deann Hampton RN on 8/23/2021 at 10:13 AM

## 2021-08-23 NOTE — CARE COORDINATION
8/23 spoke w/ patient's daughter Ting Guzman # [phone] regarding discharge plans-  Daughter initially states plan is for patient to return home and Ting Guzman will be her caregiver- Ting Guzman states she is interested in [facility] [address] [place] [address] once her mother gets home. Ting Guzman also expressed interest in home PT/OT/VN - I explained to her that this patient would not be able to have both hospice and skilled home care per Medicare guidelines - Ting Guzman is interested in her mother regaining strength through PT/OT at home --aware she can contact hospice once her skilled home care has completed . Discussed home care agencies and Ting Guzman prefers referral to [facility] as she has used them in the past - referral made to Eda with [facility]--she will follow (unsure on their staffing availability at this time). Also discussed potential need for tube feeding and need for referral to [facility] to [facility] to [facility]-- referral made to Jesse Swain # [phone]--[email or web address] faxed to [facility] @ # [phone]  Will need tube feed orders prior to discharge. Daughter also states if patient comes home with continuous tube feedings vs bolus TF - she feels her mother will need a hospital bed --will need DME order . Agreeable to referral to [facility]--Dena byrd/ [facility] notified -will follow.     Will continue to follow and assist with dc plans   Electronically signed by Redd Reilly on 8/23/2021 at 4:24 PM  #[phone]

## 2021-08-23 NOTE — PLAN OF CARE
Problem: Nutrition  Goal: Optimal nutrition therapy  8/23/2021 0949 by Xavier Worthington RD, LD  Outcome: Ongoing  8/23/2021 0758 by Edmund Owens RN  Outcome: Ongoing  Note: Patient will achieve and maintain optimal nutrition therapy. Will monitor and assess.   8/23/2021 0017 by Ladan Tracey RN  Outcome: Ongoing   Nutrition Problem #1: Moderate malnutrition  Intervention: Food and/or Nutrient Delivery: Continue Current Diet  Nutritional Goals: Initiate most appropriate form of nutrition

## 2021-08-24 LAB
ANION GAP SERPL CALCULATED.3IONS-SCNC: 11 MMOL/L (ref 3–16)
BASOPHILS ABSOLUTE: 0.1 K/UL (ref 0–0.2)
BASOPHILS RELATIVE PERCENT: 0.9 %
BUN BLDV-MCNC: 18 MG/DL (ref 7–20)
CALCIUM SERPL-MCNC: 8.6 MG/DL (ref 8.3–10.6)
CHLORIDE BLD-SCNC: 102 MMOL/L (ref 99–110)
CO2: 23 MMOL/L (ref 21–32)
CREAT SERPL-MCNC: 0.6 MG/DL (ref 0.6–1.2)
EOSINOPHILS ABSOLUTE: 0.1 K/UL (ref 0–0.6)
EOSINOPHILS RELATIVE PERCENT: 1.9 %
GFR AFRICAN AMERICAN: >60
GFR NON-AFRICAN AMERICAN: >60
GLUCOSE BLD-MCNC: 101 MG/DL (ref 70–99)
GLUCOSE BLD-MCNC: 106 MG/DL (ref 70–99)
GLUCOSE BLD-MCNC: 107 MG/DL (ref 70–99)
GLUCOSE BLD-MCNC: 109 MG/DL (ref 70–99)
GLUCOSE BLD-MCNC: 115 MG/DL (ref 70–99)
GLUCOSE BLD-MCNC: 166 MG/DL (ref 70–99)
GLUCOSE BLD-MCNC: 168 MG/DL (ref 70–99)
GLUCOSE BLD-MCNC: 86 MG/DL (ref 70–99)
HCT VFR BLD CALC: 22.1 % (ref 36–48)
HEMOGLOBIN: 7.5 G/DL (ref 12–16)
LYMPHOCYTES ABSOLUTE: 1 K/UL (ref 1–5.1)
LYMPHOCYTES RELATIVE PERCENT: 14.9 %
MAGNESIUM: 1.8 MG/DL (ref 1.8–2.4)
MCH RBC QN AUTO: 29.3 PG (ref 26–34)
MCHC RBC AUTO-ENTMCNC: 33.8 G/DL (ref 31–36)
MCV RBC AUTO: 86.9 FL (ref 80–100)
MONOCYTES ABSOLUTE: 0.9 K/UL (ref 0–1.3)
MONOCYTES RELATIVE PERCENT: 13.1 %
NEUTROPHILS ABSOLUTE: 4.7 K/UL (ref 1.7–7.7)
NEUTROPHILS RELATIVE PERCENT: 69.2 %
PDW BLD-RTO: 15.6 % (ref 12.4–15.4)
PERFORMED ON: ABNORMAL
PHOSPHORUS: 3.1 MG/DL (ref 2.5–4.9)
PLATELET # BLD: 404 K/UL (ref 135–450)
PMV BLD AUTO: 8 FL (ref 5–10.5)
POTASSIUM REFLEX MAGNESIUM: 3.5 MMOL/L (ref 3.5–5.1)
RBC # BLD: 2.54 M/UL (ref 4–5.2)
SODIUM BLD-SCNC: 136 MMOL/L (ref 136–145)
WBC # BLD: 6.7 K/UL (ref 4–11)

## 2021-08-24 PROCEDURE — 6360000002 HC RX W HCPCS: Performed by: PHYSICIAN ASSISTANT

## 2021-08-24 PROCEDURE — 84100 ASSAY OF PHOSPHORUS: CPT

## 2021-08-24 PROCEDURE — C9113 INJ PANTOPRAZOLE SODIUM, VIA: HCPCS | Performed by: PHYSICIAN ASSISTANT

## 2021-08-24 PROCEDURE — 6370000000 HC RX 637 (ALT 250 FOR IP): Performed by: INTERNAL MEDICINE

## 2021-08-24 PROCEDURE — 1200000000 HC SEMI PRIVATE

## 2021-08-24 PROCEDURE — 83735 ASSAY OF MAGNESIUM: CPT

## 2021-08-24 PROCEDURE — 6370000000 HC RX 637 (ALT 250 FOR IP): Performed by: NURSE PRACTITIONER

## 2021-08-24 PROCEDURE — 2580000003 HC RX 258: Performed by: INTERNAL MEDICINE

## 2021-08-24 PROCEDURE — 6360000002 HC RX W HCPCS: Performed by: INTERNAL MEDICINE

## 2021-08-24 PROCEDURE — 2700000000 HC OXYGEN THERAPY PER DAY

## 2021-08-24 PROCEDURE — 85025 COMPLETE CBC W/AUTO DIFF WBC: CPT

## 2021-08-24 PROCEDURE — 80048 BASIC METABOLIC PNL TOTAL CA: CPT

## 2021-08-24 PROCEDURE — 2580000003 HC RX 258: Performed by: PHYSICIAN ASSISTANT

## 2021-08-24 PROCEDURE — 92526 ORAL FUNCTION THERAPY: CPT

## 2021-08-24 PROCEDURE — 94761 N-INVAS EAR/PLS OXIMETRY MLT: CPT

## 2021-08-24 PROCEDURE — 6360000002 HC RX W HCPCS: Performed by: NURSE PRACTITIONER

## 2021-08-24 RX ORDER — LANSOPRAZOLE
30 KIT
Status: DISCONTINUED | OUTPATIENT
Start: 2021-08-25 | End: 2021-08-27 | Stop reason: HOSPADM

## 2021-08-24 RX ADMIN — INSULIN LISPRO 1 UNITS: 100 INJECTION, SOLUTION INTRAVENOUS; SUBCUTANEOUS at 21:45

## 2021-08-24 RX ADMIN — ARIPIPRAZOLE 5 MG: 5 TABLET ORAL at 10:01

## 2021-08-24 RX ADMIN — Medication 10 ML: at 10:09

## 2021-08-24 RX ADMIN — Medication 20 MG: at 10:08

## 2021-08-24 RX ADMIN — Medication 2 CAPSULE: at 10:02

## 2021-08-24 RX ADMIN — HEPARIN SODIUM 5000 UNITS: 5000 INJECTION INTRAVENOUS; SUBCUTANEOUS at 21:44

## 2021-08-24 RX ADMIN — ARIPIPRAZOLE 5 MG: 5 TABLET ORAL at 21:45

## 2021-08-24 RX ADMIN — VALSARTAN 160 MG: 160 TABLET, FILM COATED ORAL at 10:01

## 2021-08-24 RX ADMIN — Medication 2 CAPSULE: at 17:35

## 2021-08-24 RX ADMIN — PANTOPRAZOLE SODIUM 40 MG: 40 INJECTION, POWDER, FOR SOLUTION INTRAVENOUS at 10:07

## 2021-08-24 RX ADMIN — INSULIN LISPRO 1 UNITS: 100 INJECTION, SOLUTION INTRAVENOUS; SUBCUTANEOUS at 18:29

## 2021-08-24 RX ADMIN — SODIUM CHLORIDE, PRESERVATIVE FREE 10 ML: 5 INJECTION INTRAVENOUS at 10:07

## 2021-08-24 RX ADMIN — HEPARIN SODIUM 5000 UNITS: 5000 INJECTION INTRAVENOUS; SUBCUTANEOUS at 10:35

## 2021-08-24 RX ADMIN — CEFEPIME HYDROCHLORIDE 2000 MG: 2 INJECTION, POWDER, FOR SOLUTION INTRAVENOUS at 15:59

## 2021-08-24 RX ADMIN — CEFEPIME HYDROCHLORIDE 2000 MG: 2 INJECTION, POWDER, FOR SOLUTION INTRAVENOUS at 04:17

## 2021-08-24 RX ADMIN — MIRTAZAPINE 7.5 MG: 15 TABLET, ORALLY DISINTEGRATING ORAL at 21:45

## 2021-08-24 RX ADMIN — Medication 20 MG: at 21:45

## 2021-08-24 ASSESSMENT — PAIN SCALES - PAIN ASSESSMENT IN ADVANCED DEMENTIA (PAINAD)
BODYLANGUAGE: 0
TOTALSCORE: 0
FACIALEXPRESSION: 0
NEGVOCALIZATION: 0
BREATHING: 0
CONSOLABILITY: 0

## 2021-08-24 NOTE — CARE COORDINATION
[facility] received referral from RN-CM for:  [facility]OSP[facility] BED      Will need:  DME ORDERS  MD's Chart Notes.   MD may use the Smart Phrase ( .BED ) in a progress note.   Sent P.S. Msg to MD.    Will verify patient's insurance and follow up with patient prior to discharge to discuss insurance coverage and schedule delivery of the ordered item    Thank you for the referral.  Electronically signed by Yvonne Roberts on 8/24/2021 at 4:54 PM Cell ph# [phone]

## 2021-08-24 NOTE — PLAN OF CARE
Problem: Nutrition  Goal: Optimal nutrition therapy  8/24/2021 0541 by Remedios Beckwith RN  Outcome: Ongoing  8/23/2021 1937 by Tawana Santillan RN  Outcome: Ongoing  Note: Will encourage intake     Problem: Pain:  Goal: Pain level will decrease  Description: Pain level will decrease  8/24/2021 0479 by Remedios Beckwith RN  Outcome: Ongoing  Note: Pt assessed for pain. Pt denies any pain at this time. Will continue to monitor pt and assess for pain throughout rest of shift. 8/23/2021 1937 by Tawana Santillan RN  Outcome: Ongoing  Note: Pt assessed for pain. Pt in pain and assessed with 0-10 pain rating scale. Pt given prescribed analgesic for pain. (See eMar) Pt satisfied with pain relief thus far. Will reassess and continue to monitor. Goal: Control of acute pain  Description: Control of acute pain  8/24/2021 0712 by Remedios Beckwith RN  Outcome: Ongoing  Note: Pt assessed for pain. Pt denies any pain at this time. Will continue to monitor pt and assess for pain throughout rest of shift. 8/23/2021 1937 by Tawana Santillan RN  Outcome: Ongoing  Goal: Control of chronic pain  Description: Control of chronic pain  8/24/2021 0712 by Remedios Beckwith RN  Outcome: Ongoing  Note: Pt assessed for pain. Pt denies any pain at this time. Will continue to monitor pt and assess for pain throughout rest of shift.    8/23/2021 1937 by Tawana Santillan RN  Outcome: Ongoing

## 2021-08-24 NOTE — PLAN OF CARE
Problem: Nutrition  Goal: Optimal nutrition therapy  8/24/2021 1944 by Jose Vieira RN  Outcome: Ongoing  Note: Will encoruage intake. 8/24/2021 1620 by Marky Street RN  Outcome: Ongoing     Problem: Pain:  Goal: Pain level will decrease  Description: Pain level will decrease  8/24/2021 1944 by Jose Vieira RN  Outcome: Ongoing  Note: Pt assessed for pain. Pt in pain and assessed with 0-10 pain rating scale. Pt given prescribed analgesic for pain. (See eMar) Pt satisfied with pain relief thus far. Will reassess and continue to monitor. 8/24/2021 8486 by Marky Street RN  Outcome: Ongoing  Note: Pt assessed for pain. Pt denies any pain at this time. Will continue to monitor pt and assess for pain throughout rest of shift. Goal: Control of acute pain  Description: Control of acute pain  8/24/2021 1944 by Jose Vieira RN  Outcome: Ongoing  8/24/2021 0712 by Marky Street RN  Outcome: Ongoing  Note: Pt assessed for pain. Pt denies any pain at this time. Will continue to monitor pt and assess for pain throughout rest of shift. Goal: Control of chronic pain  Description: Control of chronic pain  8/24/2021 1944 by Jose Vieira RN  Outcome: Ongoing  8/24/2021 0712 by Marky Street RN  Outcome: Ongoing  Note: Pt assessed for pain. Pt denies any pain at this time. Will continue to monitor pt and assess for pain throughout rest of shift. Problem: Skin Integrity:  Goal: Will show no infection signs and symptoms  Description: Will show no infection signs and symptoms  8/24/2021 1944 by Jose Vieira RN  Outcome: Ongoing  Note: Will monitor skin and mucous members. Will turn patient every 2 hours, monitor for friction and sheering, and change dressings as needed. Will preform skin assessment every shift. 8/24/2021 1784 by Marky Street RN  Outcome: Ongoing  Note: Patient remains free from new signs and symptoms of infection during this shift.  Infection prevention measures are in place. Will continue to monitor for alterations in patient condition throughout the shift. Goal: Absence of new skin breakdown  Description: Absence of new skin breakdown  8/24/2021 1944 by Joseph Sainz RN  Outcome: Ongoing  8/24/2021 0712 by Amie Brannon RN  Outcome: Ongoing  Note: Patient skin condition and mucus membrane integrity remain unchanged during this shift. Skin breakdown prevention interventions are in place. Will continue to monitor and assess. Problem: Falls - Risk of:  Goal: Will remain free from falls  Description: Will remain free from falls  8/24/2021 1944 by Joseph Sainz RN  Outcome: Ongoing  Note: Fall risk assessment completed. Fall precautions in place. Call light within reach. Pt educated on calling for assistance before getting up. Walkway free of clutter. Will continue to monitor. 8/24/2021 2334 by Amie Brannon RN  Outcome: Ongoing  Note: Fall risk assessment completed. Fall precautions in place. Call light within reach. Pt educated on calling for assistance before getting up. Walkway free of clutter. Will continue to monitor. Goal: Absence of physical injury  Description: Absence of physical injury  8/24/2021 1944 by Joseph Sainz RN  Outcome: Ongoing  8/24/2021 0712 by Amie Brannon RN  Outcome: Ongoing  Note: Pt is free of injury. No injury noted. Fall precautions in place. Call light within reach. Will monitor.

## 2021-08-24 NOTE — CARE COORDINATION
[facility] received referral from RNPrettyCM for:  [address] [facility]      Will need:  DME ORDERS  MD's Chart Notes. MD may use the Smart Phrase ( .BED ) in a progress note. Will verify patient's insurance and follow up with patient prior to discharge to discuss insurance coverage and schedule delivery of the ordered item.     Thank you for the referral.  Electronically signed by José Levi on 8/24/2021 at 8:59 AM  Cell ph# [phone]

## 2021-08-24 NOTE — PROGRESS NOTES
INPATIENT PROGRESS NOTE        IDENTIFYING DATA/REASON FOR CONSULTATION   PATIENT:  Jamal Graff  MRN:  [id number]  ADMIT DATE: 2021  TIME OF EVALUATION: 2021 10:25 AM  HOSPITAL STAY:   LOS: 5 days   CONSULTING PHYSICIAN: Kwabena Antonio MD   REASON FOR CONSULTATION: PEG tube    Subjective:    Patient seen in follow-up. She underwent successful PEG tube placement yesterday. This morning she is in no acute distress. She is nonverbal.  Abdomen soft, nondistended. PEG site clean, dry with no active bleeding or drainage. Outer bumper set at 2 cm flush to the abdominal wall    MEDICATIONS   SCHEDULED:  cefepime, 2,000 mg, Q12H  insulin lispro, 0-3 Units, Q4H  pantoprazole, 40 mg, BID   And  sodium chloride (PF), 10 mL, BID  sodium chloride flush, 10 mL, 2 times per day  lactobacillus, 2 capsule, BID WC  ARIPiprazole, 5 mg, BID  FLUoxetine, 20 mg, BID  mirtazapine, 7.5 mg, Nightly  heparin (porcine), 5,000 Units, BID  valsartan, 160 mg, Daily      FLUIDS/DRIPS:     sodium chloride 10 mL/hr at 21 2239    sodium chloride      dextrose       PRNs: labetalol, 5 mg, Q10 Min PRN  morphine, 2 mg, Q4H PRN  LORazepam, 1 mg, Q6H PRN  labetalol, 10 mg, Q6H PRN  sodium chloride flush, 10 mL, PRN  sodium chloride, 25 mL, PRN  ondansetron, 4 mg, Q4H PRN  polyethylene glycol, 17 g, Daily PRN  acetaminophen, 650 mg, Q4H PRN   Or  acetaminophen, 650 mg, Q4H PRN  glucose, 15 g, PRN  dextrose, 12.5 g, PRN  glucagon (rDNA), 1 mg, PRN  dextrose, 100 mL/hr, PRN      ALLERGIES:    Allergies   Allergen Reactions    Buspar [Buspirone]     Ciprofloxacin     Food      STRAWBERRIES     Januvia [Sitagliptin]          PHYSICAL EXAM   VITALS:  BP (!) 179/81   Pulse 86   Temp 96.2 °F (35.7 °C) (Axillary)   Resp 16   Ht 4' 10\" (1.473 m)   Wt 84 lb 10.5 oz (38.4 kg)   SpO2 100%   BMI 17.69 kg/m²   TEMPERATURE:  Current - Temp: 96.2 °F (35.7 °C);  Max - Temp  Av.8 °F (36.6 °C)  Min: 96.2 °F (35.7 °C)  Max: 98.6 °F (37 °C)    Physical Exam:  General appearance: alert, nonverbal  Eyes: Anicteric  Head: Normocephalic, without obvious abnormality  Lungs: clear to auscultation bilaterally, Normal Effort  Heart: regular rate and rhythm, normal S1 and S2, no murmurs or rubs  Abdomen: soft, non-distended, non-tender. PEG site clean, dry with no active bleeding or drainage. Outer bumper set at 2 cm flush to the abdominal wall  Extremities: atraumatic, no cyanosis or edema  Skin: warm and dry, no jaundice  Neuro: alert, nonverbal, follows simple commands only    LABS AND IMAGING   Laboratory   Recent Labs     08/22/21  0605 08/23/21  0400 08/24/21  0415   WBC 9.0 7.7 6.7   HGB 7.6* 7.4* 7.5*   HCT 22.4* 22.3* 22.1*   MCV 86.2 85.8 86.9    390 404     Recent Labs     08/22/21  0605 08/23/21  0400 08/24/21  0415    135* 136   K 3.5 3.5 3.5   CL 98* 100 102   CO2 25 26 23   PHOS 3.2 2.3* 3.1   BUN 19 20 18   CREATININE 0.8 0.6 0.6     No results for input(s): AST, ALT, ALB, BILIDIR, BILITOT, ALKPHOS in the last 72 hours. No results for input(s): LIPASE, AMYLASE in the last 72 hours. No results for input(s): PROTIME, INR in the last 72 hours. Imaging  FL MODIFIED BARIUM SWALLOW W VIDEO   Final Result   Penetration was seen. No definite aspiration      Please see separate speech pathology report for full discussion of findings   and recommendations. XR CHEST PORTABLE   Final Result   1. The endotracheal tube tip is 2-3 cm above the tariq. 2. The tip of the enteric tube is probably in a hiatal hernia as it remains   above the diaphragm and is partially coiled. XR CHEST PORTABLE   Final Result   COPD, with bronchial thickening and interstitial airspace disease in the left   infrahilar region and right lung base laterally suspicious for superimposed   pneumonitis.              Endoscopy  EGD with PEG placement 8/23/2021 with Dr. Rae Carmona  Successful PEG tube placement   6 cm hiatal hernia   LA grade A esophagitis with some nodularity in the distal esophagus. ASSESSMENT AND RECOMMENDATIONS   Marvin Cavazos is a 76 y.o. female with PMH of dementia, HTN and prior Nissen fundoplication ([id number]) who presented on 2021 from [facility] with acute respiratory failure 2/2 aspiration pneumonia. Required intubation but has since been extubated. MBS today showed oropharyngeal dysphagia. We have been consulted for PEG placement. 1. Oropharyngeal dysphagia s/p successful PEG placement  2. Aspiration pneumonia  3. Dementia  4. Failure to thrive with severe protein malnutrition    RECOMMENDATIONS:    Okay to begin using PEG tube. Will consult Dietitian for tube feeding goal recommendations. Keep head of bed elevated during tube feeds. Outer bumper loosed to 2.5 cm. Keep outer bumper set at 2.5 cm. Place dressing over top of PEG. Do not place dressing between outer bumper and skin. Place abdominal binder to prevent patient tampering with the PEG. Flush PEG with 30mL of water tid and before and after each use. Use liquid meds when possible if necessary  Flush well before and after any medications   Do not mix feeds and medications  Please call with any problems with the [phone]. If you have any questions or need any further information, please feel free to contact us [phone]. Thank you for allowing us to participate in the care of Marvin Cavazos. The note was completed using Dragon voice recognition transcription. Every effort was made to ensure accuracy; however, inadvertent transcription errors may be present despite my best efforts to edit errors.     Lencho SNIDER

## 2021-08-24 NOTE — CONSULTS
Nutrition Note    Consult \"Tube feeding order and management\". PEG placed yesterday and GI reports okay to use now. Recommend Jevity 1.5 with goal rate of 40 ml/hr (based on 20 hr run time). Water flush 150 mL q 6 hrs.     TF regimen provides: 1600 mL TV, 1200 calories, 51 gms protein, 608 ml free water (+600 mL water flush)     Electronically signed by Tawnya Douglas RD, LD on 8/24/21 at 12:23 PM EDT    Contact: [phone]

## 2021-08-24 NOTE — PROGRESS NOTES
[facility]   Speech Therapy  Daily Dysphagia Treatment Note        Bipin Verdin  AGE: 76 y.o. GENDER: female  : 1945  [id number]  EPISODE DATE:  2021  Patient Active Problem List   Diagnosis    Acute aspiration pneumonia ([address]    Acute respiratory failure with hypoxia (HCC)    Moderate malnutrition (HCC)     Allergies   Allergen Reactions    Buspar [Buspirone]     Ciprofloxacin     Food      STRAWBERRIES     Januvia [Sitagliptin]      Treatment Diagnosis: Dysphagia       Chart review:   2021 admitted with respiratory failure: ADMISSION H&P HPI: The patient is a 67 y. o. female with hx depression, dementia, HTN, and anemia who presents to [facility] with respiratory failure. Patient is currently intubated and sedated and unable to provide any history. According to the notes, patient was having shortness of breath with congestion for the past two days at [facility]. She was saturating 76% on 5L at the nursing home and only responded up to 84% on a nonrebreather. She arrived in the ED in respiratory distress and the ED physician was concerned for aspiration. The daughter was there and stated she was a Limited Code but ok with intubation and so she was intubated in the ED for respiratory failure. In the ED, labs were significant for a sodium of 130, chloride of 93, biacrb of 19, WBC count of 13.4K. VBG showed a pH of 7.391 and pCO2 of 36.8. CXR showed bronchial thickening and interstitial airspace disease in the left infrahilar region and right lung base laterally. Sara is pending at the time of admission.      2021 intubated but extubated same day     2021 COVID-19 not detected     2021 MBS ordered.  Palliative Care notes:  Pt was enrolled in [facility] [address] [place] [address] discharged on 21 per family request to seek more aggressive care. Daughter Nik Kang wants MBS and PEG if needed then DC home with her and Hospice support.     2021 MBS completed, see results below in \"impression\" section    8/23/2021 PEG placed      DYSPHAGIA HISTORY:  EMR review limited  Per 8/19/2021 SLP note: This SLP did discuss with RN who states family has reported problems with swallowing and aspiration with reported recent test. Pt is currently in covid 19 r/o precautions. This SLP reviewed chart and did discuss with pt's daughter. Chart review did confirm Esophagram 97/27/2021) and recent EGD 7/28/2021; 7/30/2021; 8/2/2021.  Dtr reporting first EGD aborted due to difficulty passing scope. She reported 2nd and 3rd went a little better per GI report but persistent problems at meals. Dtr reports diet has progressively been downgraded from regular to mechanical soft to puree due to problems. Dtr reporting while pt was at [facility] discussions of Hospice and PEG tube. Subjective:     Current diet  Puree  Moderately honey thick    Comments regarding tolerating Current Diet:   RN reports NPO since PEG placement 8/23    Objective:     Pain   Patient Currently in Pain: No    Cognitive/Behavior   Behavior/Cognition: Cooperative, Pleasant mood, Requires cueing, Alert    Positioning   Upright in bed    PO Trials:  · Honey Thick liquids tsp: prolonged oral hold >60 sec, not receptive to cues to initiate swallow. Suspected oral residue post swallow. Not receptive to additional PO presentations  · Puree attempted but not accepted by pt     Dysphagia Tx:   Direct Dysphagia tx:  Limited to pt acceptance of honey tsp x1 with prolonged oral hold, oral residue, no cough or throat clear. Pt not receptive to additional PO honey/puree attempts when presented to lips  Dysphagia ex NA, does not follow commands  Training in compensatory strategies: pt is a feed d/t dementia and does not follow commands  Pt response to ex/training: no response    Goals:    The patient will tolerate recommended diet without observed clinical signs of aspiration ongoing   The patient/caregiver will demonstrate understanding of compensatory strategies for improved swallowing safety. ongoing    Assessment:   Impressions:   Pt asleep but arouses to verbal/tactile stim, briefly opening eyes and nodding 'yes' in response to desire for PO. Pt is nonverbal and does not follow directions. Severe oral phase dysphagia with prolonged oral hold of honey via tsp >60 sec; pt not responsive to verbal or tactile cues to stimulate swallow. Apparent oral residue post swallow initiation, requiring second delayed swallow. Pt was not receptive to additional PO attempts of honey and puree via tsp. MBS completed 8/20/2021 with the following: Marked oral stage dysphagia characterized by poor motor planning, decreased labial coordination, and decreased lingual manipulation for bolus prep. · Mastication with textured solids not assessed d/t severity of observed imps with puree and liquids. · Poor PO acceptance via teaspoon and cup d/t reduced labial opening and rounding with most optimal mode of administration of PO noted to be via teaspoon. · Poor motor planning with oral holding of all boluses for > 1 minute with increasing duration of holding noted as study progressed. · Decreased oral transit with all. · Premature bolus loss to the pharynx with all. · Oral residue with all. · Patient not receptive to cueing for prep or residue clearance. · Labial and lingual tremor noted throughout study. Moderate-severe pharyngeal stage dysphagia characterized by delayed swallow, decreased laryngeal elevation, and decreased pharyngeal peristalsis. · Premature spillage to the valleculae with all. · Penetration of nectar via teaspoon is not cleared. Due to severity of oropharyngeal impairments, there is significant concern for poor PO diet tolerance even with most optimal diet of puree and honey thick via teaspoon.  Marked oral phase impairments place patient at significant risk for inability to tolerate adequate level of PO nutrition with suspicion for increasing penetration/aspiration risks as patient fatigues. Diet Recommendations:  Solid consistency: Dysphagia Pureed (Dysphagia I) vs NPO  Liquid consistency: Moderately Thick (Honey) vs NPO  Liquid administration via: Spoon;Cup  Medication administration: Meds in puree (crushed if able; most opitaml if admisnitered via alternative means to PO)    Strategies:   Compensatory Swallowing Strategies: Upright as possible for all oral intake;Remain upright for 30-45 minutes after meals;Eat/Feed slowly; Small bites/sips; DISCONTINUE PO IF SIGNS/SYMPTOSM OF PENETRATION/ASPIRATION/REDUCED TOELRANCE    Education:  Consulted and agree with results and recommendations: Patient, RN  Patient Education: attempted on results/recs/plan  Patient Education Response: No evidence of learning    Prognosis:   Guarded for PO intake    Plan:     Continue Dysphagia Therapy: yes  Interventions: Therapeutic Interventions: Diet tolerance monitoring, Patient/Family education  Duration/Frequency of therapy while on unit: Duration/Frequency of Treatment  Duration/Frequency of Treatment: ST to follow-up 2-4 tiems during acute admission  Discharge Instructions:   Anticipate No for further skilled Speech Therapy for Dysphagia at discharge    This note serves as a D/C Summary in the event that this patient is discharged prior to the next therapy session.     Coded treatment time:0  Total treatment time: 15    Electronically signed by  Juan F Espinoza MS, CCC-SLP #[id number]  Speech Language Pathologist   on 8/24/2021 at 12:58 PM

## 2021-08-24 NOTE — PROGRESS NOTES
Patient in bed, AYALA orientation level - responds to voice and touch. No meds given PO. PM medications given without complications through PEG tube - dressing is clean, dry, intact. Patient does not appear to be in pain. Cano in place - no urethral drainage. Call light within reach, fall precautions in place. Will check on patient Q2 hours.     Electronically signed by Tawana Oliveira RN on 8/24/2021 at 1:48 AM

## 2021-08-24 NOTE — PROGRESS NOTES
Patient in bed and unable to assess orientation level. Patient responses to voice and touch. AM medications administered through PEG tube. No complications. Dressing to PEG tube is clean, dry, and intact. Cano in place with no urethral drainage at this time. Patient does not appear to be in pain at this time. Fall precautions in place, call light within reach, and bedside table nearby. Patient denies any additional requests at this time.        Electronically signed by Yelitza Calabrese RN on 8/24/2021 at 11:22 AM

## 2021-08-24 NOTE — SIGNIFICANT EVENT
Cinthya Bradshaw requires the assistance of a [facility] Bed to make frequent and immediate changes of body positions not feasible with an ordinary bed to alleviate pain. Patient needs bed height requirements to perform patient transfers, grooming, and daily living tasks. The patient requires the head of the bed to be elevated more than 30 degrees most of the time, due to patient's diagnosis. Pillows and wedges have been considered and ruled out.     Electronically signed by Steve Stevenson MD on 8/24/2021 at 5:05 PM

## 2021-08-24 NOTE — PROGRESS NOTES
Hospitalist Progress Note      PCP: Radha Macias MD    Date of Admission: 8/19/2021    Chief Complaint: worsening shortness of breath. Hospital Course:   76 y. o. female with hx depression, dementia, HTN, and anemia who presents to [facility] with respiratory failure. Patient is currently intubated and sedated and unable to provide any history. According to the notes, patient was having shortness of breath with congestion for the past two days at [facility]. She was saturating 76% on 5L at the nursing home and only responded up to 84% on a nonrebreather. She arrived in the ED in respiratory distress and the ED physician was concerned for aspiration. The daughter was there and stated she was a Limited Code but ok with intubation and so she was intubated in the ED for respiratory failure.     Patient is respiratory status improved, patient was subsequently extubated, PEG tube inserted and patient was commenced on tube feedings. Subjective:   Patient shakes her head yes and no however does not answer questions, did inquire about oral feeding, did not seem to be in any pain or distress.        Medications:  Reviewed    Infusion Medications    sodium chloride 10 mL/hr at 08/23/21 5591    sodium chloride      dextrose       Scheduled Medications    cefepime  2,000 mg IntraVENous Q12H    insulin lispro  0-3 Units Subcutaneous Q4H    pantoprazole  40 mg IntraVENous BID    And    sodium chloride (PF)  10 mL IntraVENous BID    sodium chloride flush  10 mL IntraVENous 2 times per day    lactobacillus  2 capsule Oral BID WC    ARIPiprazole  5 mg Oral BID    FLUoxetine  20 mg Oral BID    mirtazapine  7.5 mg Oral Nightly    heparin (porcine)  5,000 Units Subcutaneous BID    valsartan  160 mg Oral Daily     PRN Meds: labetalol, morphine, LORazepam, labetalol, sodium chloride flush, sodium chloride, ondansetron, polyethylene glycol, acetaminophen **OR** acetaminophen, glucose, dextrose, glucagon (rDNA), dextrose      Intake/Output Summary (Last 24 hours) at 8/24/2021 1308  Last data filed at 8/24/2021 1049  Gross per 24 hour   Intake 70 ml   Output 825 ml   Net -755 ml       Physical Exam Performed:    BP (!) 173/74   Pulse 83   Temp 95.2 °F (35.1 °C) (Axillary)   Resp 16   Ht 4' 10\" (1.473 m)   Wt 84 lb 10.5 oz (38.4 kg)   SpO2 100%   BMI 17.69 kg/m²     General appearance: Elderly cachectic female patient, on room air  HEENT: Pupils equal, round, and reactive to light. Conjunctivae/corneas clear. Neck: Supple, with full range of motion. No jugular venous distention. Trachea midline. Respiratory:  Normal respiratory effort. Clear to auscultation, bilaterally without Rales/Wheezes/Rhonchi. Cardiovascular: Regular rate and rhythm with normal S1/S2 without murmurs, rubs or gallops. Abdomen: PEG tube noted, patient breathing comfortably. Musculoskeletal: No clubbing, cyanosis or edema bilaterally. Full range of motion without deformity. Skin: Skin color, texture, turgor normal.  No rashes or lesions. Neurologic:  Neurovascularly intact without any focal sensory/motor deficits. Cranial nerves: II-XII intact, grossly non-focal.  Psychiatric: does not answer questions. Capillary Refill: Brisk,3 seconds, normal   Peripheral Pulses: +2 palpable, equal bilaterally       Labs:   Recent Labs     08/22/21  0605 08/23/21  0400 08/24/21  0415   WBC 9.0 7.7 6.7   HGB 7.6* 7.4* 7.5*   HCT 22.4* 22.3* 22.1*    390 404     Recent Labs     08/22/21  0605 08/23/21  0400 08/24/21  0415    135* 136   K 3.5 3.5 3.5   CL 98* 100 102   CO2 25 26 23   BUN 19 20 18   CREATININE 0.8 0.6 0.6   CALCIUM 8.9 8.6 8.6   PHOS 3.2 2.3* 3.1     No results for input(s): AST, ALT, BILIDIR, BILITOT, ALKPHOS in the last 72 hours. No results for input(s): INR in the last 72 hours. No results for input(s): Angel Cuellar in the last 72 hours.     Urinalysis:    No results found for: Sowmya Wetzel, [address], BACTERIA, RBCUA, BLOODU, [facility]    Radiology:  FL MODIFIED BARIUM SWALLOW W VIDEO   Final Result   Penetration was seen. No definite aspiration      Please see separate speech pathology report for full discussion of findings   and recommendations. XR CHEST PORTABLE   Final Result   1. The endotracheal tube tip is 2-3 cm above the tariq. 2. The tip of the enteric tube is probably in a hiatal hernia as it remains   above the diaphragm and is partially coiled. XR CHEST PORTABLE   Final Result   COPD, with bronchial thickening and interstitial airspace disease in the left   infrahilar region and right lung base laterally suspicious for superimposed   pneumonitis. Assessment/Plan:    Active Hospital Problems    Diagnosis     Acute aspiration pneumonia ([address] [J69.0]     Moderate malnutrition ([address] [E44.0]     Acute respiratory failure with hypoxia (HCC) [J96.01]          Shortness of breath likely 2/2 aspiration pneumonia  -given dose of Vanc and cefepime in the ED, continue with cefepime (Day 7/7)  -D-dimer minimally elevated     Sepsis 2/2 aspiration pneumonia ( resolved ) .    Meets at least 2 SIRS Criteria:  Tachypnea > 20  HR > 90  WBC > 12K  Source: lungs  -lactic acid q6h  -blood cultures x 2 drawn prior to administration of antibiotics  -IV antibiotics: cefepime (Day 6/7)  -IVF: 30 cc/kg given     Acute respiratory failure with hypoxia s/p mechanical intubation  -extubated 8/19, weaned to room air  -management as above  -wean oxygen to maintain SpO2 > 89%     Anion gap metabolic acidosis ( resolved)   -bicarb gtt, stopped with improvement  -continue to trend and monitor     Oropharyngeal dysphagia  -family wants PEG tube placement  -GI consulted  -palliative care involved  -to continue TF at home .      Depression  -continue home meds     Essential hypertension  -continue home meds  -IV Labetalol PRN for SBP > 180 as patient is not taking anything by mouth     Dementia  -continue home meds     Hypomagnesemia  -replace PRN     Catatonia  -improved after given IV Ativan        DVT Prophylaxis: heparin  Diet: tube feeding. Code Status: Limited     PT/OT Eval Status: ordered     Dispo -possible discharge tomorrow if tube feed is tolerated and home care hardware is provided. Patient's daughter Jamarcus Crew contacted , all questions answered.      Messi Crockett MD

## 2021-08-24 NOTE — CARE COORDINATION
8/24 received call from [place] with [address] [place] # [phone] - she states patient TF/supplies is covered at 80% --pt will have a 20% copay which will be approximately $70.00/month.   Electronically signed by Flako Calderon on 8/24/2021 at 2:10 PM  #[phone]

## 2021-08-24 NOTE — PROGRESS NOTES
Patient resting in bed with eyes closed. Patient tolerating tube feedings. Increased tube feed to 40 ml/hr. VSS. Shift uneventful. Fall precautions in place, call light within reach, and bedside table nearby. Will continue to monitor.     Electronically signed by Yelitza Calabrese RN on 8/24/2021 at 7:29 PM

## 2021-08-25 LAB
A/G RATIO: 0.8 (ref 1.1–2.2)
ALBUMIN SERPL-MCNC: 2.7 G/DL (ref 3.4–5)
ALP BLD-CCNC: 55 U/L (ref 40–129)
ALT SERPL-CCNC: <5 U/L (ref 10–40)
ANION GAP SERPL CALCULATED.3IONS-SCNC: 9 MMOL/L (ref 3–16)
AST SERPL-CCNC: 10 U/L (ref 15–37)
BASOPHILS ABSOLUTE: 0 K/UL (ref 0–0.2)
BASOPHILS RELATIVE PERCENT: 0.6 %
BILIRUB SERPL-MCNC: 0.3 MG/DL (ref 0–1)
BUN BLDV-MCNC: 16 MG/DL (ref 7–20)
CALCIUM SERPL-MCNC: 8.8 MG/DL (ref 8.3–10.6)
CHLORIDE BLD-SCNC: 102 MMOL/L (ref 99–110)
CO2: 24 MMOL/L (ref 21–32)
CREAT SERPL-MCNC: 0.7 MG/DL (ref 0.6–1.2)
EOSINOPHILS ABSOLUTE: 0.1 K/UL (ref 0–0.6)
EOSINOPHILS RELATIVE PERCENT: 1.5 %
GFR AFRICAN AMERICAN: >60
GFR NON-AFRICAN AMERICAN: >60
GLOBULIN: 3.6 G/DL
GLUCOSE BLD-MCNC: 209 MG/DL (ref 70–99)
GLUCOSE BLD-MCNC: 220 MG/DL (ref 70–99)
GLUCOSE BLD-MCNC: 227 MG/DL (ref 70–99)
GLUCOSE BLD-MCNC: 229 MG/DL (ref 70–99)
GLUCOSE BLD-MCNC: 232 MG/DL (ref 70–99)
GLUCOSE BLD-MCNC: 245 MG/DL (ref 70–99)
GLUCOSE BLD-MCNC: 246 MG/DL (ref 70–99)
GLUCOSE BLD-MCNC: 294 MG/DL (ref 70–99)
HCT VFR BLD CALC: 23.1 % (ref 36–48)
HEMOGLOBIN: 7.9 G/DL (ref 12–16)
LYMPHOCYTES ABSOLUTE: 0.8 K/UL (ref 1–5.1)
LYMPHOCYTES RELATIVE PERCENT: 10.4 %
MAGNESIUM: 1.8 MG/DL (ref 1.8–2.4)
MCH RBC QN AUTO: 29.5 PG (ref 26–34)
MCHC RBC AUTO-ENTMCNC: 34.3 G/DL (ref 31–36)
MCV RBC AUTO: 85.8 FL (ref 80–100)
MONOCYTES ABSOLUTE: 0.9 K/UL (ref 0–1.3)
MONOCYTES RELATIVE PERCENT: 11.6 %
NEUTROPHILS ABSOLUTE: 5.7 K/UL (ref 1.7–7.7)
NEUTROPHILS RELATIVE PERCENT: 75.9 %
PDW BLD-RTO: 16.1 % (ref 12.4–15.4)
PERFORMED ON: ABNORMAL
PHOSPHORUS: 2 MG/DL (ref 2.5–4.9)
PLATELET # BLD: 461 K/UL (ref 135–450)
PMV BLD AUTO: 7.2 FL (ref 5–10.5)
POTASSIUM REFLEX MAGNESIUM: 3.7 MMOL/L (ref 3.5–5.1)
POTASSIUM SERPL-SCNC: 3.7 MMOL/L (ref 3.5–5.1)
RBC # BLD: 2.69 M/UL (ref 4–5.2)
SODIUM BLD-SCNC: 135 MMOL/L (ref 136–145)
TOTAL PROTEIN: 6.3 G/DL (ref 6.4–8.2)
TRIGL SERPL-MCNC: 91 MG/DL (ref 0–150)
WBC # BLD: 7.5 K/UL (ref 4–11)

## 2021-08-25 PROCEDURE — 6370000000 HC RX 637 (ALT 250 FOR IP): Performed by: INTERNAL MEDICINE

## 2021-08-25 PROCEDURE — 84100 ASSAY OF PHOSPHORUS: CPT

## 2021-08-25 PROCEDURE — 85025 COMPLETE CBC W/AUTO DIFF WBC: CPT

## 2021-08-25 PROCEDURE — 2580000003 HC RX 258: Performed by: INTERNAL MEDICINE

## 2021-08-25 PROCEDURE — 2700000000 HC OXYGEN THERAPY PER DAY

## 2021-08-25 PROCEDURE — 84478 ASSAY OF TRIGLYCERIDES: CPT

## 2021-08-25 PROCEDURE — 1200000000 HC SEMI PRIVATE

## 2021-08-25 PROCEDURE — 6360000002 HC RX W HCPCS: Performed by: NURSE PRACTITIONER

## 2021-08-25 PROCEDURE — 6370000000 HC RX 637 (ALT 250 FOR IP): Performed by: NURSE PRACTITIONER

## 2021-08-25 PROCEDURE — 94761 N-INVAS EAR/PLS OXIMETRY MLT: CPT

## 2021-08-25 PROCEDURE — 92526 ORAL FUNCTION THERAPY: CPT

## 2021-08-25 PROCEDURE — 80053 COMPREHEN METABOLIC PANEL: CPT

## 2021-08-25 PROCEDURE — 2580000003 HC RX 258: Performed by: STUDENT IN AN ORGANIZED HEALTH CARE EDUCATION/TRAINING PROGRAM

## 2021-08-25 PROCEDURE — 2500000003 HC RX 250 WO HCPCS: Performed by: STUDENT IN AN ORGANIZED HEALTH CARE EDUCATION/TRAINING PROGRAM

## 2021-08-25 PROCEDURE — 83735 ASSAY OF MAGNESIUM: CPT

## 2021-08-25 RX ADMIN — ARIPIPRAZOLE 5 MG: 5 TABLET ORAL at 10:33

## 2021-08-25 RX ADMIN — HEPARIN SODIUM 5000 UNITS: 5000 INJECTION INTRAVENOUS; SUBCUTANEOUS at 10:49

## 2021-08-25 RX ADMIN — HEPARIN SODIUM 5000 UNITS: 5000 INJECTION INTRAVENOUS; SUBCUTANEOUS at 22:39

## 2021-08-25 RX ADMIN — INSULIN LISPRO 1 UNITS: 100 INJECTION, SOLUTION INTRAVENOUS; SUBCUTANEOUS at 05:57

## 2021-08-25 RX ADMIN — Medication 20 MG: at 10:34

## 2021-08-25 RX ADMIN — SODIUM PHOSPHATE, MONOBASIC, MONOHYDRATE 20 MMOL: 276; 142 INJECTION, SOLUTION INTRAVENOUS at 10:49

## 2021-08-25 RX ADMIN — Medication 20 MG: at 22:38

## 2021-08-25 RX ADMIN — SODIUM CHLORIDE, PRESERVATIVE FREE 10 ML: 5 INJECTION INTRAVENOUS at 10:52

## 2021-08-25 RX ADMIN — VALSARTAN 160 MG: 160 TABLET, FILM COATED ORAL at 10:34

## 2021-08-25 RX ADMIN — INSULIN LISPRO 1 UNITS: 100 INJECTION, SOLUTION INTRAVENOUS; SUBCUTANEOUS at 10:52

## 2021-08-25 RX ADMIN — MIRTAZAPINE 7.5 MG: 15 TABLET, ORALLY DISINTEGRATING ORAL at 22:39

## 2021-08-25 RX ADMIN — INSULIN LISPRO 1 UNITS: 100 INJECTION, SOLUTION INTRAVENOUS; SUBCUTANEOUS at 17:42

## 2021-08-25 RX ADMIN — Medication 2 CAPSULE: at 17:42

## 2021-08-25 RX ADMIN — Medication 30 MG: at 05:57

## 2021-08-25 RX ADMIN — Medication 2 CAPSULE: at 10:33

## 2021-08-25 RX ADMIN — INSULIN LISPRO 1 UNITS: 100 INJECTION, SOLUTION INTRAVENOUS; SUBCUTANEOUS at 22:39

## 2021-08-25 RX ADMIN — ARIPIPRAZOLE 5 MG: 5 TABLET ORAL at 22:38

## 2021-08-25 RX ADMIN — INSULIN LISPRO 2 UNITS: 100 INJECTION, SOLUTION INTRAVENOUS; SUBCUTANEOUS at 14:25

## 2021-08-25 RX ADMIN — INSULIN LISPRO 1 UNITS: 100 INJECTION, SOLUTION INTRAVENOUS; SUBCUTANEOUS at 02:28

## 2021-08-25 ASSESSMENT — PAIN SCALES - PAIN ASSESSMENT IN ADVANCED DEMENTIA (PAINAD)
BREATHING: 0
NEGVOCALIZATION: 0
TOTALSCORE: 0
TOTALSCORE: 0
BREATHING: 0
CONSOLABILITY: 0
BODYLANGUAGE: 0
CONSOLABILITY: 0
FACIALEXPRESSION: 0
BODYLANGUAGE: 0
NEGVOCALIZATION: 0
FACIALEXPRESSION: 0

## 2021-08-25 ASSESSMENT — PAIN SCALES - GENERAL: PAINLEVEL_OUTOF10: 0

## 2021-08-25 NOTE — PLAN OF CARE
Problem: Nutrition  Goal: Optimal nutrition therapy  Outcome: Ongoing  Note: Will encourage intake. Problem: Pain:  Goal: Pain level will decrease  Description: Pain level will decrease  Outcome: Ongoing  Note: Pt assessed for pain. Pt in pain and assessed with 0-10 pain rating scale. Pt given prescribed analgesic for pain. (See eMar) Pt satisfied with pain relief thus far. Will reassess and continue to monitor. Goal: Control of acute pain  Description: Control of acute pain  Outcome: Ongoing  Goal: Control of chronic pain  Description: Control of chronic pain  Outcome: Ongoing     Problem: Skin Integrity:  Goal: Will show no infection signs and symptoms  Description: Will show no infection signs and symptoms  Outcome: Ongoing  Note: Will monitor skin and mucous members. Will turn patient every 2 hours, monitor for friction and sheering, and change dressings as needed. Will preform skin assessment every shift. Goal: Absence of new skin breakdown  Description: Absence of new skin breakdown  Outcome: Ongoing     Problem: Falls - Risk of:  Goal: Will remain free from falls  Description: Will remain free from falls  Outcome: Ongoing  Note: Fall risk assessment completed. Fall precautions in place. Call light within reach. Pt educated on calling for assistance before getting up. Walkway free of clutter. Will continue to monitor.      Goal: Absence of physical injury  Description: Absence of physical injury  Outcome: Ongoing

## 2021-08-25 NOTE — CARE COORDINATION
DISCHARGE SUMMARY     DATE OF DISCHARGE: pending    DISCHARGE DESTINATION: home    HOME CARE: Yes    Agency Name: Discharging to Facility/ Agency   · Name: [address]  · Address:  [address] Leila [place] [address]  · Phone:  [phone]  · Fax:  [phone]    TRANSPORTATION: [place] [address] Name: [facility] up Time: 12:45PM    Phone Number: [phone]    NEW DME ORDERED: yes - hospital bed per [facility]    Patient also to dcd home with tube feeds--referral to Erendira Gomez   # [phone] fax# [phone]  Spoke w/ [address]  with [place] # [phone]    tube feed and pump benefits verified- coverage 80% -pt will have 20% copay-- patient will be billed $71.79 - Erendira Gomez to discuss with pt daughter Della Sneed agreeable to above plan      Electronically signed by Nirmal Ventura on 8/25/2021 at 3:51 PM  #[phone]

## 2021-08-25 NOTE — CARE COORDINATION
[facility]  Unable to staff in a timely  manner, [facility] is able to accept,  aware.    Dawn Sotelo LPN  CTN with  [facility],  [address], Fax [phone]

## 2021-08-25 NOTE — PROGRESS NOTES
Hospitalist Progress Note      PCP: Jennifer Parks MD    Date of Admission: 8/19/2021    Chief Complaint: worsening shortness of breath. Hospital Course:   76 y. o. female with hx depression, dementia, HTN, and anemia who presents to [facility] with respiratory failure. Patient is currently intubated and sedated and unable to provide any history. According to the notes, patient was having shortness of breath with congestion for the past two days at [facility]. She was saturating 76% on 5L at the nursing home and only responded up to 84% on a nonrebreather. She arrived in the ED in respiratory distress and the ED physician was concerned for aspiration. The daughter was there and stated she was a Limited Code but ok with intubation and so she was intubated in the ED for respiratory failure.     Patient is respiratory status improved, patient was subsequently extubated, PEG tube inserted and patient was commenced on tube feedings. Subjective:   Patient opens eyes to stimuli however does not respond to questions, does not seem seem to be in any pain or distress, tolerating tube feeds.       Medications:  Reviewed    Infusion Medications    sodium chloride 10 mL/hr at 08/23/21 2239    sodium chloride      dextrose       Scheduled Medications    sodium phosphate IVPB  20 mmol IntraVENous Once    lansoprazole  30 mg Per G Tube QAM AC    insulin lispro  0-3 Units Subcutaneous Q4H    sodium chloride flush  10 mL IntraVENous 2 times per day    lactobacillus  2 capsule Oral BID WC    ARIPiprazole  5 mg Oral BID    FLUoxetine  20 mg Oral BID    mirtazapine  7.5 mg Oral Nightly    heparin (porcine)  5,000 Units Subcutaneous BID    valsartan  160 mg Oral Daily     PRN Meds: morphine, LORazepam, labetalol, sodium chloride flush, sodium chloride, ondansetron, polyethylene glycol, acetaminophen **OR** acetaminophen, glucose, dextrose, glucagon (rDNA), dextrose      Intake/Output Summary (Last 24 hours) at 8/25/2021 1407  Last data filed at 8/25/2021 1102  Gross per 24 hour   Intake 215 ml   Output --   Net 215 ml       Physical Exam Performed:    BP (!) 154/70   Pulse 82   Temp 98 °F (36.7 °C) (Axillary)   Resp 18   Ht 4' 10\" (1.473 m)   Wt 92 lb 9.5 oz (42 kg)   SpO2 95%   BMI 19.35 kg/m²     General appearance: Elderly cachectic female patient, on room air  HEENT: Pupils equal, round, and reactive to light. Conjunctivae/corneas clear. Neck: Supple, with full range of motion. No jugular venous distention. Trachea midline. Respiratory:  Normal respiratory effort. Clear to auscultation, bilaterally without Rales/Wheezes/Rhonchi. Cardiovascular: Regular rate and rhythm with normal S1/S2 without murmurs, rubs or gallops. Abdomen: PEG tube noted, patient breathing comfortably. Musculoskeletal: No clubbing, cyanosis or edema bilaterally. Full range of motion without deformity. Skin: Skin color, texture, turgor normal.  No rashes or lesions. Neurologic:  Neurovascularly intact without any focal sensory/motor deficits. Cranial nerves: II-XII intact, grossly non-focal.  Psychiatric: does not answer questions. Capillary Refill: Brisk,3 seconds, normal   Peripheral Pulses: +2 palpable, equal bilaterally       Labs:   Recent Labs     08/23/21  0400 08/24/21  0415 08/25/21  0608   WBC 7.7 6.7 7.5   HGB 7.4* 7.5* 7.9*   HCT 22.3* 22.1* 23.1*    404 461*     Recent Labs     08/23/21  0400 08/24/21  0415 08/25/21  0608   * 136 135*   K 3.5 3.5 3.7    102 102   CO2 26 23 24   BUN 20 18 16   CREATININE 0.6 0.6 0.7   CALCIUM 8.6 8.6 8.8   PHOS 2.3* 3.1 2.0*     Recent Labs     08/25/21  0608   AST 10*   ALT <5*   BILITOT 0.3   ALKPHOS 55     No results for input(s): INR in the last 72 hours. No results for input(s): Jf Oiler in the last 72 hours.     Urinalysis:    No results found for: JESI, [address] Professor Andres Menjivar [facility] [address], [address] [facility] [address], [facility] 994    Radiology:  FL MODIFIED BARIUM SWALLOW W VIDEO   Final Result   Penetration was seen. No definite aspiration      Please see separate speech pathology report for full discussion of findings   and recommendations. XR CHEST PORTABLE   Final Result   1. The endotracheal tube tip is 2-3 cm above the tariq. 2. The tip of the enteric tube is probably in a hiatal hernia as it remains   above the diaphragm and is partially coiled. XR CHEST PORTABLE   Final Result   COPD, with bronchial thickening and interstitial airspace disease in the left   infrahilar region and right lung base laterally suspicious for superimposed   pneumonitis. Assessment/Plan:    Active Hospital Problems    Diagnosis     Acute aspiration pneumonia ([address] [J69.0]     Moderate malnutrition ([address] [E44.0]     Acute respiratory failure with hypoxia (HCC) [J96.01]          Shortness of breath likely 2/2 aspiration pneumonia  -given dose of Vanc and cefepime in the ED, completed 7 days of cefepime, currently off antibiotics  -D-dimer minimally elevated     Sepsis 2/2 aspiration pneumonia ( resolved ) . Meets at least 2 SIRS Criteria:  Tachypnea > 20  HR > 90  WBC > 12K  Source: lungs  -Aspiration pneumonia treated with 7 days of cefepime, patient is currently vitally stable     Acute respiratory failure with hypoxia s/p mechanical intubation  -extubated 8/19, weaned to 2 L of oxygen via nasal cannula  -management as above  -wean oxygen to maintain SpO2 > 89%  -Qualify for home oxygen     Anion gap metabolic acidosis ( resolved)   -bicarb gtt, stopped with improvement  -continue to trend and monitor     Oropharyngeal dysphagia  -PEG placed as per wishes of the family.   - Continue tube feeding at home     Depression  -continue home meds     Essential hypertension  -continue home meds  -IV Labetalol PRN for SBP > 180 as patient is not taking anything by mouth     Dementia  -continue home meds     Hypomagnesemia  -replace PRN     Catatonia  -improved after given IV Ativan        DVT Prophylaxis: heparin  Diet: tube feeding. Code Status: Limited     PT/OT Eval Status: ordered     Dispo   DME for home bed written. Patient is pending discharge to home once equipment are available  Patient's daughter Jamarcus Crew contacted , all questions answered.      Messi Crockett MD

## 2021-08-25 NOTE — DISCHARGE INSTR - COC
Continuity of Care Form    Patient Name: Raeann Emmanuel   :    MRN:  [id number]    Admit date:  2021  Discharge date:  21      Code Status Order: Limited   Advance Directives:   Advance Care Flowsheet Documentation       Date/Time Healthcare Directive Type of Healthcare Directive Copy in [address] Agent's Name Healthcare Agent's Phone Number    21 1205  No, patient does not have an advance directive for healthcare treatment  --  --  --  --  --            Admitting Physician:  Mago Lutz MD  PCP: King Crowell MD    Discharging Nurse: Luis Davalos [facility] Unit/Room#: Q7I-4221/7928-03  Discharging Unit Phone Number: [phone]    Emergency Contact:   Extended Emergency Contact Information  Primary Emergency Contact: Percy Holloway [facility] Phone: [phone]  Mobile Phone: [phone]  Relation: Child  Preferred language: English  Secondary Emergency Contact: Percy Holloway, [address] [place] [address] Phone: [phone]  Mobile Phone: [phone]  Relation: Child  Preferred language: English    Past Surgical History:  Past Surgical History:   Procedure Laterality Date    GASTROSTOMY TUBE PLACEMENT N/A 2021    EGD PEG TUBE PLACEMENT performed by Lyly Sánchez MD at [address] [place] [address]  2021    EGD BIOPSY performed by Lyly Sánchez MD at [facility] ENDOSCOPY       Immunization History: There is no immunization history on file for this patient.     Active Problems:  Patient Active Problem List   Diagnosis Code    Acute aspiration pneumonia ([address] J69.0    Acute respiratory failure with hypoxia ([address] J96.01    Moderate malnutrition ([address] E44.0       Isolation/Infection:   Isolation            No Isolation          Patient Infection Status       Infection Onset Added Last Indicated Last Indicated By Review Planned Expiration Resolved Resolved By    None active    Resolved    COVID-19 Rule Out 21 COVID-19 (Ordered)   08/19/21 Rule-Out Test Resulted            Nurse Assessment:  Last Vital Signs: BP (!) 154/70   Pulse 82   Temp 98 °F (36.7 °C) (Axillary)   Resp 18   Ht 4' 10\" (1.473 m)   Wt 92 lb 9.5 oz (42 kg)   SpO2 95%   BMI 19.35 kg/m²     Last documented pain score (0-10 scale): Pain Level: 0  Last Weight:   Wt Readings from Last 1 Encounters:   08/25/21 92 lb 9.5 oz (42 kg)     Mental Status:  disoriented, alert and incomprehensible speech at times    IV Access:  - None    Nursing Mobility/ADLs:  Walking   Dependent  Transfer  Dependent  Bathing  Dependent  Dressing  Dependent  Toileting  Dependent  Feeding  Dependent  Med Admin  Dependent  Med Delivery   crushed and via PEG tube    Wound Care Documentation and Therapy:        Elimination:  Continence:   · Bowel: No  · Bladder: No  Urinary Catheter: Insertion Date: 8/20; Indications for [facility], [facility]  Colostomy/Ileostomy/Ileal Conduit: No       Date of Last BM: 08/27/21      Intake/Output Summary (Last 24 hours) at 8/25/2021 1623  Last data filed at 8/25/2021 1102  Gross per 24 hour   Intake 215 ml   Output --   Net 215 ml     I/O last 3 completed shifts: In: 215 [I.V.:20; NG/GT:195]  Out: -     Safety Concerns:     Aspiration Risk and Aspiration when PEG infusing tube feed    Impairments/Disabilities:      None    Nutrition Therapy:  Current Nutrition Therapy:   - Tube Feedings:  Standard with fiber    Routes of Feeding: {P DME Other Feedings:[id number]}  Liquids: No Liquids  Daily Fluid Restriction: no  Last Modified Barium Swallow with Video (Video Swallowing Test): not done    Treatments at the Time of Hospital Discharge:   Respiratory Treatments: N/A  Oxygen Therapy:  is not on home oxygen therapy.   Ventilator:    - No ventilator support    Rehab Therapies: Physical Therapy, Occupational Therapy and Nurse  Weight Bearing Status/Restrictions: No weight bearing restirctions  Other Medical Equipment (for information only, NOT a DME order):  hospital bed  Other Treatments: N/A    Patient's personal belongings (please select all that are sent with patient):  None    RN SIGNATURE:  Electronically signed by Dang Bean RN on 8/27/21 at 11:21 AM EDT    CASE MANAGEMENT/SOCIAL WORK SECTION    Inpatient Status Date: ***    Readmission Risk Assessment Score:  Readmission Risk              Risk of Unplanned Readmission:  16           Discharging to Facility/ Agency   · Name: [address] [place] [address]  · Address:  · Phone:  · Fax:    Dialysis Facility (if applicable)   · Name:  · Address:  · Dialysis Schedule:  · Phone:  · Fax:    / signature: {Esignature:[id number]}    PHYSICIAN SECTION    Prognosis: Poor    Condition at Discharge: Terminal    Rehab Potential (if transferring to Rehab): Poor    Recommended Labs or Other Treatments After Discharge:   - home hospice care. Physician Certification: I certify the above information and transfer of Mari Graves  is necessary for the continuing treatment of the diagnosis listed and that she requires [address] for greater 30 days.      Update Admission H&P: No change in H&P    PHYSICIAN SIGNATURE:  Electronically signed by Elli Thakkar MD on 8/27/21 at 8:30 AM EDT

## 2021-08-25 NOTE — PROGRESS NOTES
Psychiatric   Speech Therapy  Daily Dysphagia Treatment and Discharge Note        Guy Nageotte  AGE: 76 y.o. GENDER: female  : 1945  [id number]  EPISODE DATE:  2021  Patient Active Problem List   Diagnosis    Acute aspiration pneumonia ([facility]    Acute respiratory failure with hypoxia (HCC)    Moderate malnutrition (HCC)     Allergies   Allergen Reactions    Buspar [Buspirone]     Ciprofloxacin     Food      STRAWBERRIES     Januvia [Sitagliptin]      Treatment Diagnosis: Dysphagia       Chart review:   2021 admitted with respiratory failure: ADMISSION H&P HPI: The patient is a 67 y. o. female with hx depression, dementia, HTN, and anemia who presents to [facility] with respiratory failure. Patient is currently intubated and sedated and unable to provide any history. According to the notes, patient was having shortness of breath with congestion for the past two days at [facility]. She was saturating 76% on 5L at the nursing home and only responded up to 84% on a nonrebreather. She arrived in the ED in respiratory distress and the ED physician was concerned for aspiration. The daughter was there and stated she was a Limited Code but ok with intubation and so she was intubated in the ED for respiratory failure. In the ED, labs were significant for a sodium of 130, chloride of 93, biacrb of 19, WBC count of 13.4K. VBG showed a pH of 7.391 and pCO2 of 36.8. CXR showed bronchial thickening and interstitial airspace disease in the left infrahilar region and right lung base laterally. Sara is pending at the time of admission.      2021 intubated but extubated same day     2021 COVID-19 not detected     2021 MBS ordered.  Palliative Care notes:  Pt was enrolled in [facility]osp[facility] [address] [place] [address] discharged on 21 per family request to seek more aggressive care. Daughter Mel Donaldson wants MBS and PEG if needed then DC home with her and Hospice support.     2021 MBS completed, see results below in \"impression\" section    8/23/2021 PEG placed      DYSPHAGIA HISTORY:  EMR review limited  Per 8/19/2021 SLP note: This SLP did discuss with RN who states family has reported problems with swallowing and aspiration with reported recent test. Pt is currently in covid 19 r/o precautions. This SLP reviewed chart and did discuss with pt's daughter. Chart review did confirm Esophagram 97/27/2021) and recent EGD 7/28/2021; 7/30/2021; 8/2/2021.  Dtr reporting first EGD aborted due to difficulty passing scope. She reported 2nd and 3rd went a little better per GI report but persistent problems at meals. Dtr reports diet has progressively been downgraded from regular to mechanical soft to puree due to problems. Dtr reporting while pt was at [facility] AT [facility] discussions of Hospice and PEG tube. Subjective:     Current diet  NPO    Comments regarding tolerating Current Diet:   RN reports PEG running at goal rate; potential plan for DC with hospice? Objective:     Pain   Patient Currently in Pain: AYALA)    Cognitive/Behavior   Behavior/Cognition: Cooperative, lethargic, does not follow commands    Positioning   Upright in bed    PO Trials:  · Honey Thick liquids tsp: prolonged oral hold >75 sec, not receptive to cues to initiate swallow. Suspected oral residue post swallow. Oral care to remove mild residue. · Puree held 2/2 severity of deficits    Dysphagia Tx:   Direct Dysphagia tx: Oral care prior to PO with toothette x1; reduced labial seal, reduced lingual retraction, tongue pumping. PO limited to pt acceptance of honey tsp x1 with prolonged oral hold >75sec, oral residue, no cough or throat clear. Oral care after PO trial for removal of mild residue. Additional PO attempts held 2/2 severity  Dysphagia ex NA, does not follow commands  Training in compensatory strategies: pt is a feed d/t dementia and does not follow commands  Pt response to ex/training: no response    Goals:    The patient will tolerate recommended diet without observed clinical signs of aspiration discontinue   The patient/caregiver will demonstrate understanding of compensatory strategies for improved swallowing safety. Met     Assessment:   Impressions:   Pt asleep but arouses to verbal/tactile stim, and is receptive to limited oral care and PO. Pt is nonverbal and does not follow directions. Persistent severe oral phase dysphagia with prolonged oral hold of honey via tsp >75 sec; pt not responsive to verbal or tactile cues to stimulate swallow. Apparent oral residue post swallow initiation, requiring oral care for removal of mild residue. Pt not demonstrating adequate alertness for safe participation with additional PO attempts of honey and puree via tsp. MBS completed 8/20/2021 with the following: Marked oral stage dysphagia characterized by poor motor planning, decreased labial coordination, and decreased lingual manipulation for bolus prep. · Mastication with textured solids not assessed d/t severity of observed imps with puree and liquids. · Poor PO acceptance via teaspoon and cup d/t reduced labial opening and rounding with most optimal mode of administration of PO noted to be via teaspoon. · Poor motor planning with oral holding of all boluses for > 1 minute with increasing duration of holding noted as study progressed. · Decreased oral transit with all. · Premature bolus loss to the pharynx with all. · Oral residue with all. · Patient not receptive to cueing for prep or residue clearance. · Labial and lingual tremor noted throughout study. Moderate-severe pharyngeal stage dysphagia characterized by delayed swallow, decreased laryngeal elevation, and decreased pharyngeal peristalsis. · Premature spillage to the valleculae with all. · Penetration of nectar via teaspoon is not cleared.    Due to severity of oropharyngeal impairments, there is significant concern for poor PO diet tolerance even with most optimal diet of puree and honey thick via teaspoon. Marked oral phase impairments place patient at significant risk for inability to tolerate adequate level of PO nutrition with suspicion for increasing penetration/aspiration risks as patient fatigues. Diet Recommendations:  Solid consistency: Dysphagia Pureed (Dysphagia I) vs NPO  Liquid consistency: Moderately Thick (Honey) vs NPO  Liquid administration via: Spoon;Cup  Medication administration: Meds in puree (crushed if able; most opitaml if admisnitered via alternative means to PO)    Strategies:   Compensatory Swallowing Strategies: Upright as possible for all oral intake;Remain upright for 30-45 minutes after meals;Eat/Feed slowly; Small bites/sips; DISCONTINUE PO IF SIGNS/SYMPTOMS OF PENETRATION/ASPIRATION/REDUCED TOLERANCE    Education:  Consulted and agree with results and recommendations: Patient, RN  Patient Education: attempted on results/recs/plan  Patient Education Response: No evidence of learning    Prognosis:   Guarded for PO intake    Plan:     Continue Dysphagia Therapy: no    This note serves as a D/C Summary    Coded treatment time:0  Total treatment time: 15    Electronically signed by  Yaron Dominguez MS, CCC-SLP #[id number]  Speech Language Pathologist   on 8/25/2021 at 0518IB

## 2021-08-25 NOTE — PROGRESS NOTES
Patient in bed, AYALA orientation level, patient responds to voice and touch. Patient NPO. Tube feeds running at 40mL/hr. Residual volume 10mL. PM medications given without complications through PEG tube. HOB locked at 30 degrees. Cano catheter in place. Patient flores snot appear to be in pain. Call light within reach, fall precautions in place. Will check on patient Q2 hours.     Electronically signed by Nik Carrasco RN on 8/25/2021 at 2:37 AM

## 2021-08-25 NOTE — CARE COORDINATION
[employer] rep received DME Orders & Documentation for the [facility] Bed. [employer] rep contacted patient's daughter Keven Rosa and reviewed insurance coverage & copays, and discussed the equipment delivery, rental, and pickup process. [employer] rep left written info with Esvin's contact info on the patient's nightstand. Pt's dtr is aware. The Hospital Bed will be delivered to the patient's verified address in the morning/early afternoon on Thursday 8/26/21. Pt's dtr Baca Shelley and RN-SARAH are aware.     Thank you for the referral.  Electronically signed by Demond Chester on 8/25/2021 at 3:25 PM Cell ph# [phone]

## 2021-08-26 LAB
ANION GAP SERPL CALCULATED.3IONS-SCNC: 10 MMOL/L (ref 3–16)
BASOPHILS ABSOLUTE: 0.1 K/UL (ref 0–0.2)
BASOPHILS RELATIVE PERCENT: 0.7 %
BUN BLDV-MCNC: 14 MG/DL (ref 7–20)
CALCIUM SERPL-MCNC: 8.8 MG/DL (ref 8.3–10.6)
CHLORIDE BLD-SCNC: 102 MMOL/L (ref 99–110)
CO2: 26 MMOL/L (ref 21–32)
CREAT SERPL-MCNC: 0.6 MG/DL (ref 0.6–1.2)
EOSINOPHILS ABSOLUTE: 0.1 K/UL (ref 0–0.6)
EOSINOPHILS RELATIVE PERCENT: 1.1 %
GFR AFRICAN AMERICAN: >60
GFR NON-AFRICAN AMERICAN: >60
GLUCOSE BLD-MCNC: 127 MG/DL (ref 70–99)
GLUCOSE BLD-MCNC: 130 MG/DL (ref 70–99)
GLUCOSE BLD-MCNC: 133 MG/DL (ref 70–99)
GLUCOSE BLD-MCNC: 162 MG/DL (ref 70–99)
GLUCOSE BLD-MCNC: 186 MG/DL (ref 70–99)
GLUCOSE BLD-MCNC: 209 MG/DL (ref 70–99)
HCT VFR BLD CALC: 24.3 % (ref 36–48)
HEMOGLOBIN: 8.4 G/DL (ref 12–16)
LYMPHOCYTES ABSOLUTE: 1.1 K/UL (ref 1–5.1)
LYMPHOCYTES RELATIVE PERCENT: 12.1 %
MAGNESIUM: 1.9 MG/DL (ref 1.8–2.4)
MCH RBC QN AUTO: 29.4 PG (ref 26–34)
MCHC RBC AUTO-ENTMCNC: 34.4 G/DL (ref 31–36)
MCV RBC AUTO: 85.5 FL (ref 80–100)
MONOCYTES ABSOLUTE: 0.9 K/UL (ref 0–1.3)
MONOCYTES RELATIVE PERCENT: 10.3 %
NEUTROPHILS ABSOLUTE: 6.7 K/UL (ref 1.7–7.7)
NEUTROPHILS RELATIVE PERCENT: 75.8 %
PDW BLD-RTO: 15.7 % (ref 12.4–15.4)
PERFORMED ON: ABNORMAL
PHOSPHORUS: 2.8 MG/DL (ref 2.5–4.9)
PLATELET # BLD: 559 K/UL (ref 135–450)
PMV BLD AUTO: 7.4 FL (ref 5–10.5)
POTASSIUM REFLEX MAGNESIUM: 3.8 MMOL/L (ref 3.5–5.1)
POTASSIUM SERPL-SCNC: NORMAL MMOL/L (ref 3.5–5.1)
RBC # BLD: 2.84 M/UL (ref 4–5.2)
SODIUM BLD-SCNC: 138 MMOL/L (ref 136–145)
WBC # BLD: 8.9 K/UL (ref 4–11)

## 2021-08-26 PROCEDURE — 94760 N-INVAS EAR/PLS OXIMETRY 1: CPT

## 2021-08-26 PROCEDURE — 80048 BASIC METABOLIC PNL TOTAL CA: CPT

## 2021-08-26 PROCEDURE — 83735 ASSAY OF MAGNESIUM: CPT

## 2021-08-26 PROCEDURE — 85025 COMPLETE CBC W/AUTO DIFF WBC: CPT

## 2021-08-26 PROCEDURE — 2580000003 HC RX 258: Performed by: INTERNAL MEDICINE

## 2021-08-26 PROCEDURE — 1200000000 HC SEMI PRIVATE

## 2021-08-26 PROCEDURE — 6370000000 HC RX 637 (ALT 250 FOR IP): Performed by: NURSE PRACTITIONER

## 2021-08-26 PROCEDURE — 6370000000 HC RX 637 (ALT 250 FOR IP): Performed by: INTERNAL MEDICINE

## 2021-08-26 PROCEDURE — 6360000002 HC RX W HCPCS: Performed by: NURSE PRACTITIONER

## 2021-08-26 PROCEDURE — 84100 ASSAY OF PHOSPHORUS: CPT

## 2021-08-26 RX ADMIN — Medication 2 CAPSULE: at 18:06

## 2021-08-26 RX ADMIN — HEPARIN SODIUM 5000 UNITS: 5000 INJECTION INTRAVENOUS; SUBCUTANEOUS at 11:07

## 2021-08-26 RX ADMIN — MIRTAZAPINE 7.5 MG: 15 TABLET, ORALLY DISINTEGRATING ORAL at 22:35

## 2021-08-26 RX ADMIN — Medication 20 MG: at 11:07

## 2021-08-26 RX ADMIN — VALSARTAN 160 MG: 160 TABLET, FILM COATED ORAL at 11:06

## 2021-08-26 RX ADMIN — HEPARIN SODIUM 5000 UNITS: 5000 INJECTION INTRAVENOUS; SUBCUTANEOUS at 22:35

## 2021-08-26 RX ADMIN — Medication 30 MG: at 11:07

## 2021-08-26 RX ADMIN — INSULIN LISPRO 1 UNITS: 100 INJECTION, SOLUTION INTRAVENOUS; SUBCUTANEOUS at 03:05

## 2021-08-26 RX ADMIN — ARIPIPRAZOLE 5 MG: 5 TABLET ORAL at 11:06

## 2021-08-26 RX ADMIN — ARIPIPRAZOLE 5 MG: 5 TABLET ORAL at 22:35

## 2021-08-26 RX ADMIN — Medication 20 MG: at 22:35

## 2021-08-26 RX ADMIN — SODIUM CHLORIDE, PRESERVATIVE FREE 10 ML: 5 INJECTION INTRAVENOUS at 22:36

## 2021-08-26 RX ADMIN — Medication 2 CAPSULE: at 11:05

## 2021-08-26 ASSESSMENT — PAIN SCALES - PAIN ASSESSMENT IN ADVANCED DEMENTIA (PAINAD)
NEGVOCALIZATION: 0
BODYLANGUAGE: 0
CONSOLABILITY: 0
BREATHING: 0
FACIALEXPRESSION: 0
TOTALSCORE: 0

## 2021-08-26 ASSESSMENT — PAIN SCALES - GENERAL: PAINLEVEL_OUTOF10: 0

## 2021-08-26 ASSESSMENT — PAIN SCALES - WONG BAKER: WONGBAKER_NUMERICALRESPONSE: 0

## 2021-08-26 NOTE — PROGRESS NOTES
Hospitalist Progress Note      PCP: Matthias Cruz MD    Date of Admission: 8/19/2021    Chief Complaint: worsening shortness of breath. Hospital Course:   76 y. o. female with hx depression, dementia, HTN, and anemia who presents to [facility] with respiratory failure. Patient is currently intubated and sedated and unable to provide any history. According to the notes, patient was having shortness of breath with congestion for the past two days at [facility]. She was saturating 76% on 5L at the nursing home and only responded up to 84% on a nonrebreather. She arrived in the ED in respiratory distress and the ED physician was concerned for aspiration. The daughter was there and stated she was a Limited Code but ok with intubation and so she was intubated in the ED for respiratory failure.     Patient is respiratory status improved, patient was subsequently extubated, PEG tube inserted and patient was commenced on tube feedings. Patient had an episode of aspiration, where tube feeds was aspirated from lung suctioning. Subjective:   Patient was seen and examined. Patient does not respond to verbal commands. Opens eyes spontaneously, not in any pain or distress.      Medications:  Reviewed    Infusion Medications    sodium chloride 10 mL/hr at 08/23/21 1379    sodium chloride      dextrose       Scheduled Medications    lansoprazole  30 mg Per G Tube QAM AC    insulin lispro  0-3 Units Subcutaneous Q4H    sodium chloride flush  10 mL IntraVENous 2 times per day    lactobacillus  2 capsule Oral BID WC    ARIPiprazole  5 mg Oral BID    FLUoxetine  20 mg Oral BID    mirtazapine  7.5 mg Oral Nightly    heparin (porcine)  5,000 Units Subcutaneous BID    valsartan  160 mg Oral Daily     PRN Meds: morphine, LORazepam, labetalol, sodium chloride flush, sodium chloride, ondansetron, polyethylene glycol, acetaminophen **OR** acetaminophen, glucose, dextrose, glucagon (rDNA), dextrose      Intake/Output Summary (Last 24 hours) at 8/26/2021 1420  Last data filed at 8/26/2021 1031  Gross per 24 hour   Intake 1500 ml   Output 600 ml   Net 900 ml       Physical Exam Performed:    BP (!) 143/80   Pulse 97   Temp 98.1 °F (36.7 °C) (Axillary)   Resp 13   Ht 4' 10\" (1.473 m)   Wt 92 lb 2.4 oz (41.8 kg)   SpO2 96%   BMI 19.26 kg/m²     General appearance: Elderly cachectic female patient, on room air  HEENT: Pupils equal, round, and reactive to light. Conjunctivae/corneas clear. Neck: Supple, with full range of motion. No jugular venous distention. Trachea midline. Respiratory:  Normal respiratory effort. Clear to auscultation, bilaterally without Rales/Wheezes/Rhonchi. Cardiovascular: Regular rate and rhythm with normal S1/S2 without murmurs, rubs or gallops. Abdomen: PEG tube noted, patient breathing comfortably. Musculoskeletal: No clubbing, cyanosis or edema bilaterally. Full range of motion without deformity. Skin: Skin color, texture, turgor normal.  No rashes or lesions. Neurologic:  Neurovascularly intact without any focal sensory/motor deficits. Cranial nerves: II-XII intact, grossly non-focal.  Psychiatric: does not answer questions. Capillary Refill: Brisk,3 seconds, normal   Peripheral Pulses: +2 palpable, equal bilaterally       Labs:   Recent Labs     08/24/21  0415 08/25/21  0608 08/26/21  0555   WBC 6.7 7.5 8.9   HGB 7.5* 7.9* 8.4*   HCT 22.1* 23.1* 24.3*    461* 559*     Recent Labs     08/24/21  0415 08/24/21  0415 08/25/21  0608 08/26/21  0555     --  135* 138   K 3.5   < > 3.7  3.7 see below  3.8     --  102 102   CO2 23  --  24 26   BUN 18  --  16 14   CREATININE 0.6  --  0.7 0.6   CALCIUM 8.6  --  8.8 8.8   PHOS 3.1  --  2.0* 2.8    < > = values in this interval not displayed. Recent Labs     08/25/21  0608   AST 10*   ALT <5*   BILITOT 0.3   ALKPHOS 55     No results for input(s): INR in the last 72 hours.   No results for input(s): Tamar Xie in the last 72 hours. Urinalysis:    No results found for: [place] Harada, BACTERIA, RBCUA, BLOODU, SPECGRAV, L[address]    Radiology:  FL MODIFIED BARIUM SWALLOW W VIDEO   Final Result   Penetration was seen. No definite aspiration      Please see separate speech pathology report for full discussion of findings   and recommendations. XR CHEST PORTABLE   Final Result   1. The endotracheal tube tip is 2-3 cm above the tariq. 2. The tip of the enteric tube is probably in a hiatal hernia as it remains   above the diaphragm and is partially coiled. XR CHEST PORTABLE   Final Result   COPD, with bronchial thickening and interstitial airspace disease in the left   infrahilar region and right lung base laterally suspicious for superimposed   pneumonitis. Assessment/Plan:    Active Hospital Problems    Diagnosis     Acute aspiration pneumonia ([address] [J69.0]     Moderate malnutrition ([address] [E44.0]     Acute respiratory failure with hypoxia ([address] [J96.01]      Oropharyngeal dysphagia  Aspiration event. Patient was noted to have tube feeding on suctioning, tube feeding was held, I personally discussed and updated patient's daughter Munira Perdue. Patient's daughter understands that patient will continue to aspirate, would like to observe for 24 hours and discharge tomorrow with high risk PEG feeding. Writer explained that aspiration is likely to happen again, patient's daughter understands. Plan   -PEG placed as per wishes of the family. - Continue tube feeding at a reduced rate    Shortness of breath likely 2/2 aspiration pneumonia  -given dose of Vanc and cefepime in the ED, completed 7 days of cefepime, currently off antibiotics  -D-dimer minimally elevated     Sepsis 2/2 aspiration pneumonia ( resolved ) .    Meets at least 2 SIRS Criteria:  Tachypnea > 20  HR > 90  WBC > 12K  Source: lungs  -Aspiration pneumonia treated with 7 days of cefepime, patient is currently vitally stable     Acute respiratory failure with hypoxia s/p mechanical intubation  -extubated 8/19, weaned to 2 L of oxygen via nasal cannula  -management as above  -wean oxygen to maintain SpO2 > 89%  -Qualify for home oxygen     Anion gap metabolic acidosis ( resolved)   -bicarb gtt, stopped with improvement  -continue to trend and monitor       Depression  -continue home meds     Essential hypertension  -continue home meds  -IV Labetalol PRN for SBP > 180 as patient is not taking anything by mouth     Dementia  -continue home meds     Hypomagnesemia  -replace PRN     Catatonia  -improved after given IV Ativan        DVT Prophylaxis: heparin  Diet: tube feeding. Code Status: Limited     PT/OT Eval Status: ordered     Dispo   Discharge tomorrow with high risk PEG tube feeding.    Plan is transition to hospice after discharge  Patient's daughter was contacted    Anna Perez MD

## 2021-08-26 NOTE — PROGRESS NOTES
Medications were given through GT . Pt has tolerated. Pt. Has been orally suctioned for coughing and congestion in mouth and throat and mouth care has been done every 2 hours .

## 2021-08-26 NOTE — PROGRESS NOTES
PCA called this RN into patient's room, stated patient sounded like she needed to be suctioned. Patient was found to be aspirating on tube feed. Tube feed stopped, patient was suctioned at the mouth. Pt does not appear to be in distress and is not cyanotic, respirations normal, O2 at 97% room air. Residual 55mL, abdomen not distended. Lung sounds rhonchi. Deion Hernandez NP notified - stated to keep tube feeds off and monitor respiratory status. HOB remains above 30 degrees. Patient appears to be resting quietly. Will continue to monitor patient.     Electronically signed by Lesley Stern RN on 8/26/2021 at 3:36 AM

## 2021-08-26 NOTE — PROGRESS NOTES
PALLIATIVE MEDICINE PROGRESS NOTE     Patient name:Evelyn Andrews    AHK:[id number] :1945  Room/Bed:S2Y-5033/3104-01    LOS: 7 days        ASSESSMENT/RECOMMENDATIONS     76 y.o. female with Dyspnea, AMS and dysphagia        Symptom Management:  Dyspnea- Pt aspirated last night and is now struggling with resp status  AMS- pt not speaking this am she has end stage dementia  Dysphagia- per family frequent aspiration, PEG tube was placed pt NPO  Goals of Care- Talked to daughter Catrachito Bentley she is hoping for MBS and PEG tube if needed and then to re-enroll in Hospice. She does not want any further intubation or CPR.      Patient/Family Goals of Care :    Pt was enrolled in Hospice of [address] [place] [address] discharged on 21 per family request to seek more aggressive care. Daughter Catrachito Bentley wants MBS and PEG if needed then DC home with her and Hospice support.   Pt aspirated TF overnight. TF is on hold currently. Plan was to DC home with therapy and TF but pt has declined and is no longer eligible. Talked to her daughter Catrachito Bentley and she was tearful but stated that now she can feel like she tried everything and she knew that pts time was coming soon. We discussed that prognosis is poor and goal is for comfort only TF will remain off at this time that Hospice can re-evaluate feeding for comfort once pt return home.      Disposition/Discharge Plan:   Pending     Advance Directives:  Surrogate Decision Maker: No HCPOA  Code status:  Limited     Case discussed with: patient, floor RN, Catrachito Bentley, Dr Marina Townsend  Thank you for allowing us to participate in the care of this patient. SUBJECTIVE     Chief Complaint: Dyspnea    Last 24 hours:   Pt increased resp distress following aspiration last night. ROS:  Review of Systems -   History obtained from chart review and unobtainable from patient due to non-verbal     Patient unable to complete full ROS due to current cognitive status.   Information that is obtained from nursing and chart. OBJECTIVE   BP (!) 143/80   Pulse 97   Temp 98.1 °F (36.7 °C) (Axillary)   Resp 13   Ht 4' 10\" (1.473 m)   Wt 92 lb 2.4 oz (41.8 kg)   SpO2 96%   BMI 19.26 kg/m²   I/O last 3 completed shifts:   In: 1700 [I.V.:20; NG/GT:1680]  Out: -   I/O this shift:  In: -   Out: 600 [Urine:600]      Physical Examination:   General appearance - chronically ill appearing  Mental status - non-verbal  Neck - supple, no significant adenopathy  Chest - no tachypnea, retractions or cyanosis  Abdomen - soft, nontender, nondistended, no masses or organomegaly  Musculoskeletal - osteoarthritic changes noted in both hands  Skin - normal coloration and turgor, no rashes, no suspicious skin lesions noted       Signed By: Electronically signed by SCOUT Villa CNP on 8/26/2021 at 1:00 PM   Palliative Medicine   [phone]    August 26, 2021

## 2021-08-26 NOTE — CARE COORDINATION
[facility] rep notified by [facility] that patient's family electing Hospice care eff 8/27/21. [facility] will need to remove the hospital bed they delivered on 8/26/21. [facility] rep scheduled pickup of the [facility] Bed for 8/27/21. Notified SW.     Thank you for the referral.  Electronically signed by Mitzy Neal on 8/26/2021 at 3:41 PM Cell ph# [phone]

## 2021-08-26 NOTE — CARE COORDINATION
D/c cancelled as patient was aspirating tube feed. MD ordered palliative care and Hospice consult. Daughter wants [facility]ospice of [address] [facility] [address]. Referral made. They did eval and are planning d/c to home via [facility] tomorrow @ [address] will order hospital bed. Notified [facility] who will  their bed tomorrow. Notified [facility] of change in plans. Will not cancel [facility] services at this time per palliative care notes. Daughter will be informed of plans by hospice as they are meeting with her at home this afternoon.    Electronically signed by Annamaria Aparicio on 8/26/2021 at 3:55 PM

## 2021-08-26 NOTE — PROGRESS NOTES
[facility] nurse here to assess pt. And make sure equipment will be in place for discharge tomorrow at 1200.

## 2021-08-27 VITALS
RESPIRATION RATE: 14 BRPM | BODY MASS INDEX: 18.14 KG/M2 | SYSTOLIC BLOOD PRESSURE: 159 MMHG | DIASTOLIC BLOOD PRESSURE: 70 MMHG | OXYGEN SATURATION: 99 % | HEIGHT: 58 IN | HEART RATE: 97 BPM | WEIGHT: 86.42 LBS | TEMPERATURE: 97.4 F

## 2021-08-27 LAB
ANION GAP SERPL CALCULATED.3IONS-SCNC: 9 MMOL/L (ref 3–16)
BASOPHILS ABSOLUTE: 0.1 K/UL (ref 0–0.2)
BASOPHILS RELATIVE PERCENT: 0.8 %
BUN BLDV-MCNC: 18 MG/DL (ref 7–20)
CALCIUM SERPL-MCNC: 9.3 MG/DL (ref 8.3–10.6)
CHLORIDE BLD-SCNC: 103 MMOL/L (ref 99–110)
CO2: 25 MMOL/L (ref 21–32)
CREAT SERPL-MCNC: 0.6 MG/DL (ref 0.6–1.2)
EOSINOPHILS ABSOLUTE: 0.1 K/UL (ref 0–0.6)
EOSINOPHILS RELATIVE PERCENT: 1.5 %
GFR AFRICAN AMERICAN: >60
GFR NON-AFRICAN AMERICAN: >60
GLUCOSE BLD-MCNC: 123 MG/DL (ref 70–99)
GLUCOSE BLD-MCNC: 132 MG/DL (ref 70–99)
GLUCOSE BLD-MCNC: 143 MG/DL (ref 70–99)
HCT VFR BLD CALC: 23.4 % (ref 36–48)
HEMOGLOBIN: 8 G/DL (ref 12–16)
LYMPHOCYTES ABSOLUTE: 1.3 K/UL (ref 1–5.1)
LYMPHOCYTES RELATIVE PERCENT: 15 %
MAGNESIUM: 1.9 MG/DL (ref 1.8–2.4)
MCH RBC QN AUTO: 29.1 PG (ref 26–34)
MCHC RBC AUTO-ENTMCNC: 34.3 G/DL (ref 31–36)
MCV RBC AUTO: 85.1 FL (ref 80–100)
MONOCYTES ABSOLUTE: 0.8 K/UL (ref 0–1.3)
MONOCYTES RELATIVE PERCENT: 9.1 %
NEUTROPHILS ABSOLUTE: 6.5 K/UL (ref 1.7–7.7)
NEUTROPHILS RELATIVE PERCENT: 73.6 %
PDW BLD-RTO: 15.9 % (ref 12.4–15.4)
PERFORMED ON: ABNORMAL
PERFORMED ON: ABNORMAL
PHOSPHORUS: 2.9 MG/DL (ref 2.5–4.9)
PLATELET # BLD: 602 K/UL (ref 135–450)
PMV BLD AUTO: 7 FL (ref 5–10.5)
POTASSIUM REFLEX MAGNESIUM: 3.9 MMOL/L (ref 3.5–5.1)
POTASSIUM SERPL-SCNC: 3.9 MMOL/L (ref 3.5–5.1)
RBC # BLD: 2.75 M/UL (ref 4–5.2)
SODIUM BLD-SCNC: 137 MMOL/L (ref 136–145)
WBC # BLD: 8.9 K/UL (ref 4–11)

## 2021-08-27 PROCEDURE — 6370000000 HC RX 637 (ALT 250 FOR IP): Performed by: NURSE PRACTITIONER

## 2021-08-27 PROCEDURE — 6360000002 HC RX W HCPCS: Performed by: INTERNAL MEDICINE

## 2021-08-27 PROCEDURE — 83735 ASSAY OF MAGNESIUM: CPT

## 2021-08-27 PROCEDURE — 36415 COLL VENOUS BLD VENIPUNCTURE: CPT

## 2021-08-27 PROCEDURE — 2580000003 HC RX 258: Performed by: INTERNAL MEDICINE

## 2021-08-27 PROCEDURE — 6370000000 HC RX 637 (ALT 250 FOR IP): Performed by: INTERNAL MEDICINE

## 2021-08-27 PROCEDURE — 85025 COMPLETE CBC W/AUTO DIFF WBC: CPT

## 2021-08-27 PROCEDURE — 80048 BASIC METABOLIC PNL TOTAL CA: CPT

## 2021-08-27 PROCEDURE — 94760 N-INVAS EAR/PLS OXIMETRY 1: CPT

## 2021-08-27 PROCEDURE — 84100 ASSAY OF PHOSPHORUS: CPT

## 2021-08-27 RX ORDER — MORPHINE SULFATE 2 MG/ML
5 INJECTION, SOLUTION INTRAMUSCULAR; INTRAVENOUS EVERY 4 HOURS PRN
Qty: 50 ML | Refills: 0 | Status: SHIPPED | OUTPATIENT
Start: 2021-08-27 | End: 2021-08-27 | Stop reason: HOSPADM

## 2021-08-27 RX ORDER — MORPHINE SULFATE 20 MG/5ML
5 SOLUTION ORAL
Qty: 1 BOTTLE | Refills: 0 | Status: SHIPPED | OUTPATIENT
Start: 2021-08-27 | End: 2021-08-30

## 2021-08-27 RX ORDER — LORAZEPAM 2 MG/ML
0.26 CONCENTRATE ORAL EVERY 4 HOURS PRN
Qty: 1 BOTTLE | Refills: 0 | Status: SHIPPED | OUTPATIENT
Start: 2021-08-27 | End: 2021-08-30

## 2021-08-27 RX ADMIN — ARIPIPRAZOLE 5 MG: 5 TABLET ORAL at 09:14

## 2021-08-27 RX ADMIN — MORPHINE SULFATE 2 MG: 2 INJECTION, SOLUTION INTRAMUSCULAR; INTRAVENOUS at 09:14

## 2021-08-27 RX ADMIN — Medication 30 MG: at 06:04

## 2021-08-27 RX ADMIN — Medication 20 MG: at 09:14

## 2021-08-27 RX ADMIN — Medication 2 CAPSULE: at 09:14

## 2021-08-27 RX ADMIN — SODIUM CHLORIDE, PRESERVATIVE FREE 10 ML: 5 INJECTION INTRAVENOUS at 10:11

## 2021-08-27 RX ADMIN — VALSARTAN 160 MG: 160 TABLET, FILM COATED ORAL at 09:14

## 2021-08-27 ASSESSMENT — PAIN SCALES - PAIN ASSESSMENT IN ADVANCED DEMENTIA (PAINAD)
TOTALSCORE: 0
NEGVOCALIZATION: 0
BREATHING: 0
BODYLANGUAGE: 0
CONSOLABILITY: 0
FACIALEXPRESSION: 0

## 2021-08-27 ASSESSMENT — PAIN SCALES - GENERAL
PAINLEVEL_OUTOF10: 4
PAINLEVEL_OUTOF10: 8

## 2021-08-27 ASSESSMENT — PAIN SCALES - WONG BAKER
WONGBAKER_NUMERICALRESPONSE: 4
WONGBAKER_NUMERICALRESPONSE: 8

## 2021-08-27 NOTE — PROGRESS NOTES
Patient is resting in bed. Patient non verbal but able to follow some commands. Vitals are stable. G tube in place and clamped. Cano draining deb urine. triple lumen in place to the right groin and infusing. Assessment complete. Oral care completed at this time. Turning patient Q2H. All patient needs are met at this time. Fall precautions are in place. call light is in reach. Will continue to monitor.    Electronically signed by Dung Cobb RN on 8/26/2021 at 11:46 PM

## 2021-08-27 NOTE — PLAN OF CARE
Problem: Nutrition  Goal: Optimal nutrition therapy  8/26/2021 2342 by Lianne Palacios RN  Outcome: Ongoing  Note: Will continue to monitor I&O.    8/26/2021 1853 by Nelida Javier RN  Outcome: Ongoing  Note: Pt. Is not tolerating tube feeding but is tolerating medications in GT. Problem: Pain:  Goal: Pain level will decrease  Description: Pain level will decrease  8/26/2021 2342 by Lianne Palacios RN  Outcome: Ongoing  8/26/2021 1853 by Nelida Javier RN  Outcome: Ongoing  Goal: Control of acute pain  Description: Control of acute pain  8/26/2021 2342 by Lianne Palacios RN  Outcome: Ongoing  Note: Pt assessed for pain. Pt denies pain and assessed with 0-10 pain rating scale. Pt has not requested prescribed analgesic. (See eMar) Pt satisfied thus far. Will reassess and continue to monitor. 8/26/2021 1853 by Nelida Javier RN  Outcome: Ongoing  Goal: Control of chronic pain  Description: Control of chronic pain  8/26/2021 2342 by Lianne Palacios RN  Outcome: Ongoing  8/26/2021 1853 by Nelida Jvaier RN  Outcome: Ongoing     Problem: Skin Integrity:  Goal: Will show no infection signs and symptoms  Description: Will show no infection signs and symptoms  8/26/2021 2342 by Lianne Palacios RN  Outcome: Ongoing  Note: Pt is free of signs and symptoms of infection. Incision and dressing are clean, dry and intact. Vital signs stable. Will monitor. 8/26/2021 1853 by Nelida Javier RN  Outcome: Ongoing  Goal: Absence of new skin breakdown  Description: Absence of new skin breakdown  8/26/2021 2342 by Lianne Palacios RN  Outcome: Ongoing  Note: Patient will be absent of new skin breakdown    8/26/2021 1853 by Nelida Javier RN  Outcome: Ongoing     Problem: Falls - Risk of:  Goal: Will remain free from falls  Description: Will remain free from falls  8/26/2021 2342 by Lianne Palacios RN  Outcome: Ongoing  Note: Fall risk assessment completed. Fall precautions in place.  Call light within reach. Pt educated on calling for assistance before getting up. Walkway free of clutter. Will continue to monitor. 8/26/2021 1853 by Sabina Deras RN  Outcome: Ongoing  Goal: Absence of physical injury  Description: Absence of physical injury  8/26/2021 2342 by Jossy Perez RN  Outcome: Ongoing  Note: Pt is free of injury. No injury noted. Fall precautions in place. Call light within reach. Will monitor.      8/26/2021 1853 by Sabina Deras RN  Outcome: Ongoing   Electronically signed by Jossy Perez RN on 8/26/2021 at 11:43 PM

## 2021-08-27 NOTE — PROGRESS NOTES
Pt rounded on this morning Q2h, whiteboard updated, and needs assessed. Pt lying in bed, Alert and non-verbal. Pt scheduled for 1200 pickup to return to home with hospice, no needs or concerns noted at this time. Call light within reach, bed alarm in place. Will continue to monitor and reassess.    Electronically signed by Allie Chan RN on 8/27/2021 at 11:09 AM

## 2021-08-27 NOTE — CARE COORDINATION
DISCHARGE SUMMARY:      DISCHARGE DATE:                 8/27/2021        DISCHARGE PLACE:                Home with [address] services     TRANSPORTATION:                [facility]              TIME:                              1200     Electronically signed by LEONORA Ochoa, MISHELW, Case Management on 8/27/2021 at 12:00 PM  [facility] [id number]

## 2021-08-27 NOTE — DISCHARGE INSTR - DIET

## 2021-08-27 NOTE — PLAN OF CARE
Problem: Nutrition  Goal: Optimal nutrition therapy  8/27/2021 1117 by Efra Soriano RN  Outcome: Ongoing   Nutrition Problem #1: Moderate malnutrition  Intervention: Food and/or Nutrient Delivery: Continue Current Diet  Nutritional Goals: Initiate most appropriate form of nutrition  Electronically signed by Efra Soriano RN on 8/27/2021 at 11:17 AM    Problem: Pain:  Goal: Pain level will decrease  Description: Pain level will decrease  8/27/2021 1117 by Efra Soriano RN  Outcome: Ongoing   Pt assessed for pain. Pt in pain and assessed with PAIN-AD pain rating scale. Pt given prescribed analgesic for pain. (See eMar) Pt appears satisfied with pain relief thus far. Will reassess and continue to monitor. Electronically signed by Efra Soriano RN on 8/27/2021 at 11:17 AM      Problem: Skin Integrity:  Goal: Will show no infection signs and symptoms  Description: Will show no infection signs and symptoms  8/27/2021 1117 by Efra Soriano RN  Outcome: Ongoing   Pt is free of signs and symptoms of infection. PEG site and surrounding skin clean, dry and intact. Vital signs stable. Will monitor. Electronically signed by Efra Soriano RN on 8/27/2021 at 11:18 AM    Problem: Skin Integrity:  Goal: Absence of new skin breakdown  Description: Absence of new skin breakdown  8/27/2021 1117 by Efra Soriano RN  Outcome: Ongoing   Skin assessment completed. No skin breakdown noted. Pt reminded to turn and reposition frequently. Will continue to monitor and reassess. Electronically signed by Efra Soriano RN on 8/27/2021 at 11:18 AM    Problem: Falls - Risk of:  Goal: Will remain free from falls  Description: Will remain free from falls  8/27/2021 1117 by Efra Soriano RN  Outcome: Ongoing   Fall risk assessment completed. Fall precautions in place. Call light within reach. Pt educated on calling for assistance before getting up. Walkway free of clutter. Will continue to monitor. Electronically signed by Byron Hamilton RN on 8/27/2021 at 11:18 AM    Problem: Falls - Risk of:  Goal: Absence of physical injury  Description: Absence of physical injury  8/27/2021 1117 by Byron Hamilton RN  Outcome: Ongoing   Pt is free of injury. No injury noted. Fall precautions in place. Call light within reach. Will monitor.    Electronically signed by Byron Hamilton RN on 8/27/2021 at 11:19 AM

## 2021-08-27 NOTE — DISCHARGE SUMMARY
Hospital Medicine Discharge Summary    Patient ID: Karina Gonzalez      Patient's PCP: Yuliana Sarkar MD    Admit Date: 8/19/2021     Discharge Date:  08/27/21    Admitting Physician: Trang Norris MD     Discharge Physician: Francisco Javier Jerez MD     Discharge Diagnoses: Active Hospital Problems    Diagnosis     Acute aspiration pneumonia ([facility] [J69.0]     Moderate malnutrition ([facility] [E44.0]        The patient was seen and examined on day of discharge and this discharge summary is in conjunction with any daily progress note from day of discharge. Hospital Course:   76 y. o. female with hx depression, dementia, HTN, and anemia who presents to [facility] with respiratory failure. Patient is currently intubated and sedated and unable to provide any history. According to the notes, patient was having shortness of breath with congestion for the past two days at [facility]. She was saturating 76% on 5L at the nursing home and only responded up to 84% on a nonrebreather. She arrived in the ED in respiratory distress and the ED physician was concerned for aspiration. The daughter was there and stated she was a Limited Code but ok with intubation and so she was intubated in the ED for respiratory failure.     Patient is respiratory status improved, patient was subsequently extubated, PEG tube inserted and patient was commenced on tube feedings. Patient was noted to have episodes of aspiration after commencement of PEG feeding, rate was decreased, after further discussion with the patient's healthcare proxy and daughter, patient's CODE STATUS was changed to comfort care, patient will be discharged with home hospice.            Physical Exam Performed:     BP (!) 159/70   Pulse 97   Temp 97.4 °F (36.3 °C) (Axillary)   Resp 14   Ht 4' 10\" (1.473 m)   Wt 86 lb 6.7 oz (39.2 kg)   SpO2 99%   BMI 18.06 kg/m²     General appearance: Elderly cachectic female patient, on room air  HEENT: Pupils equal, round, and reactive to light. Conjunctivae/corneas clear. Neck: Supple, with full range of motion. No jugular venous distention. Trachea midline. Respiratory:  Normal respiratory effort. Clear to auscultation, bilaterally without Rales/Wheezes/Rhonchi. Cardiovascular: Regular rate and rhythm with normal S1/S2 without murmurs, rubs or gallops. Abdomen: PEG tube noted, patient breathing comfortably. Musculoskeletal: No clubbing, cyanosis or edema bilaterally. Full range of motion without deformity. Skin: Skin color, texture, turgor normal.  No rashes or lesions. Neurologic:  Neurovascularly intact without any focal sensory/motor deficits. Cranial nerves: II-XII intact, grossly non-focal.  Psychiatric: does not answer questions. Capillary Refill: Brisk,3 seconds, normal   Peripheral Pulses: +2 palpable, equal bilaterally     Labs: For convenience and continuity at follow-up the following most recent labs are provided:      CBC:    Lab Results   Component Value Date    WBC 8.9 08/27/2021    HGB 8.0 08/27/2021    HCT 23.4 08/27/2021     08/27/2021       Renal:    Lab Results   Component Value Date     08/27/2021    K 3.9 08/27/2021    K 3.9 08/27/2021     08/27/2021    CO2 25 08/27/2021    BUN 18 08/27/2021    CREATININE 0.6 08/27/2021    CALCIUM 9.3 08/27/2021    PHOS 2.9 08/27/2021         Significant Diagnostic Studies    Radiology:   FL MODIFIED BARIUM SWALLOW W VIDEO   Final Result   Penetration was seen. No definite aspiration      Please see separate speech pathology report for full discussion of findings   and recommendations. XR CHEST PORTABLE   Final Result   1. The endotracheal tube tip is 2-3 cm above the tariq. 2. The tip of the enteric tube is probably in a hiatal hernia as it remains   above the diaphragm and is partially coiled.          XR CHEST PORTABLE   Final Result   COPD, with bronchial thickening and interstitial airspace disease in the left   infrahilar region and

## (undated) DEVICE — FORCEPS BX 240CM 2.4MM L NDL RAD JAW 4 M00513334

## (undated) DEVICE — CONTAINER SPEC 480ML CLR POLYSTYR 10% NEUT BUFF FRMLN ZN

## (undated) DEVICE — BITE BLOCK ENDOSCP AD 60 FR W/ ADJ STRP PLAS GRN BLOX

## (undated) DEVICE — ENDOSCOPY KIT: Brand: MEDLINE INDUSTRIES, INC.

## (undated) DEVICE — KIT PEG 20FR STD PUL EN ACCS DEV ENDOVIVE